# Patient Record
Sex: FEMALE | Race: WHITE | NOT HISPANIC OR LATINO | Employment: UNEMPLOYED | URBAN - NONMETROPOLITAN AREA
[De-identification: names, ages, dates, MRNs, and addresses within clinical notes are randomized per-mention and may not be internally consistent; named-entity substitution may affect disease eponyms.]

---

## 2017-02-07 RX ORDER — RISPERIDONE 2 MG/1
TABLET ORAL
Qty: 60 TABLET | Refills: 0 | OUTPATIENT
Start: 2017-02-07

## 2017-02-07 RX ORDER — ESCITALOPRAM OXALATE 20 MG/1
TABLET ORAL
Qty: 30 TABLET | Refills: 0 | OUTPATIENT
Start: 2017-02-07

## 2017-02-09 ENCOUNTER — TELEPHONE (OUTPATIENT)
Dept: FAMILY MEDICINE CLINIC | Facility: CLINIC | Age: 37
End: 2017-02-09

## 2017-02-09 RX ORDER — RISPERIDONE 1 MG/1
1 TABLET ORAL 2 TIMES DAILY
Qty: 60 TABLET | Refills: 3 | Status: SHIPPED | OUTPATIENT
Start: 2017-02-09 | End: 2019-05-22

## 2017-02-09 RX ORDER — ESCITALOPRAM OXALATE 20 MG/1
20 TABLET ORAL DAILY
Qty: 30 TABLET | Refills: 3 | Status: SHIPPED | OUTPATIENT
Start: 2017-02-09 | End: 2019-05-22

## 2017-02-09 NOTE — TELEPHONE ENCOUNTER
----- Message from Lavon Traore MD sent at 2/9/2017  2:14 PM CST -----  Regarding: RE: PRESCRIPTIONS  Contact: [phone]  Could you let her know I sent them in, thank you   ----- Message -----     From: Kiran RODRIGUEZ Joshua     Sent: 2/9/2017   2:02 PM       To: Lavon Traore MD  Subject: PRESCRIPTIONS                                    PT is calling because she ran out of refills on her prescription. I set her up with an appt on 2/13/17 because I said she would probably have to be seen before any refills were sent. Said I would stills send a message to see if you can still refill her prescriptions before the appt.    PT is requesting a refill of: LEXAPRO, RISPERDAL  Sent to: JAELYN DASILVA    Please send this at your earliest convenience or let PT know if there is any issues filling this Prescription.    Thanks.

## 2017-03-27 ENCOUNTER — LAB (OUTPATIENT)
Dept: LAB | Facility: HOSPITAL | Age: 37
End: 2017-03-27

## 2017-03-27 ENCOUNTER — OFFICE VISIT (OUTPATIENT)
Dept: ENDOCRINOLOGY | Facility: CLINIC | Age: 37
End: 2017-03-27

## 2017-03-27 VITALS
WEIGHT: 139.2 LBS | HEIGHT: 63 IN | BODY MASS INDEX: 24.66 KG/M2 | HEART RATE: 83 BPM | SYSTOLIC BLOOD PRESSURE: 98 MMHG | DIASTOLIC BLOOD PRESSURE: 64 MMHG

## 2017-03-27 DIAGNOSIS — R94.6 ABNORMAL THYROID FUNCTION TEST: Primary | ICD-10-CM

## 2017-03-27 DIAGNOSIS — Z72.0 TOBACCO ABUSE: ICD-10-CM

## 2017-03-27 LAB
ALBUMIN SERPL-MCNC: 4.3 G/DL (ref 3.4–4.8)
ALBUMIN/GLOB SERPL: 1.6 G/DL (ref 1.1–1.8)
ALP SERPL-CCNC: 61 U/L (ref 38–126)
ALT SERPL W P-5'-P-CCNC: 23 U/L (ref 9–52)
ANION GAP SERPL CALCULATED.3IONS-SCNC: 14 MMOL/L (ref 5–15)
AST SERPL-CCNC: 27 U/L (ref 14–36)
BASOPHILS # BLD AUTO: 0.03 10*3/MM3 (ref 0–0.2)
BASOPHILS NFR BLD AUTO: 0.5 % (ref 0–2)
BILIRUB SERPL-MCNC: 0.4 MG/DL (ref 0.2–1.3)
BUN BLD-MCNC: 18 MG/DL (ref 7–21)
BUN/CREAT SERPL: 25.7 (ref 7–25)
CALCIUM SPEC-SCNC: 9.3 MG/DL (ref 8.4–10.2)
CHLORIDE SERPL-SCNC: 106 MMOL/L (ref 95–110)
CO2 SERPL-SCNC: 21 MMOL/L (ref 22–31)
CREAT BLD-MCNC: 0.7 MG/DL (ref 0.5–1)
DEPRECATED RDW RBC AUTO: 43.5 FL (ref 36.4–46.3)
EOSINOPHIL # BLD AUTO: 0.32 10*3/MM3 (ref 0–0.7)
EOSINOPHIL NFR BLD AUTO: 5.3 % (ref 0–7)
ERYTHROCYTE [DISTWIDTH] IN BLOOD BY AUTOMATED COUNT: 13.4 % (ref 11.5–14.5)
GFR SERPL CREATININE-BSD FRML MDRD: 96 ML/MIN/1.73 (ref 64–149)
GLOBULIN UR ELPH-MCNC: 2.7 GM/DL (ref 2.3–3.5)
GLUCOSE BLD-MCNC: 89 MG/DL (ref 60–100)
HCT VFR BLD AUTO: 40.5 % (ref 35–45)
HGB BLD-MCNC: 13.8 G/DL (ref 12–15.5)
IMM GRANULOCYTES # BLD: 0 10*3/MM3 (ref 0–0.02)
IMM GRANULOCYTES NFR BLD: 0 % (ref 0–0.5)
LYMPHOCYTES # BLD AUTO: 1.93 10*3/MM3 (ref 0.6–4.2)
LYMPHOCYTES NFR BLD AUTO: 31.7 % (ref 10–50)
MCH RBC QN AUTO: 30.1 PG (ref 26.5–34)
MCHC RBC AUTO-ENTMCNC: 34.1 G/DL (ref 31.4–36)
MCV RBC AUTO: 88.4 FL (ref 80–98)
MONOCYTES # BLD AUTO: 0.5 10*3/MM3 (ref 0–0.9)
MONOCYTES NFR BLD AUTO: 8.2 % (ref 0–12)
NEUTROPHILS # BLD AUTO: 3.31 10*3/MM3 (ref 2–8.6)
NEUTROPHILS NFR BLD AUTO: 54.3 % (ref 37–80)
PLATELET # BLD AUTO: 240 10*3/MM3 (ref 150–450)
PMV BLD AUTO: 10.7 FL (ref 8–12)
POTASSIUM BLD-SCNC: 3.9 MMOL/L (ref 3.5–5.1)
PROT SERPL-MCNC: 7 G/DL (ref 6.3–8.6)
RBC # BLD AUTO: 4.58 10*6/MM3 (ref 3.77–5.16)
SODIUM BLD-SCNC: 141 MMOL/L (ref 137–145)
T3 SERPL-MCNC: 120 NG/DL (ref 97–169)
T4 FREE SERPL-MCNC: 1.09 NG/DL (ref 0.78–2.19)
TSH SERPL DL<=0.05 MIU/L-ACNC: 0.21 MIU/ML (ref 0.46–4.68)
WBC NRBC COR # BLD: 6.09 10*3/MM3 (ref 3.2–9.8)

## 2017-03-27 PROCEDURE — 99204 OFFICE O/P NEW MOD 45 MIN: CPT | Performed by: NURSE PRACTITIONER

## 2017-03-27 PROCEDURE — 84480 ASSAY TRIIODOTHYRONINE (T3): CPT | Performed by: NURSE PRACTITIONER

## 2017-03-27 PROCEDURE — 86376 MICROSOMAL ANTIBODY EACH: CPT | Performed by: NURSE PRACTITIONER

## 2017-03-27 PROCEDURE — 80050 GENERAL HEALTH PANEL: CPT | Performed by: NURSE PRACTITIONER

## 2017-03-27 PROCEDURE — 84439 ASSAY OF FREE THYROXINE: CPT | Performed by: NURSE PRACTITIONER

## 2017-03-27 PROCEDURE — 36415 COLL VENOUS BLD VENIPUNCTURE: CPT | Performed by: NURSE PRACTITIONER

## 2017-03-27 PROCEDURE — 84445 ASSAY OF TSI GLOBULIN: CPT | Performed by: NURSE PRACTITIONER

## 2017-03-27 RX ORDER — MECLIZINE HYDROCHLORIDE 25 MG/1
TABLET ORAL
Refills: 1 | COMMUNITY
Start: 2017-03-11 | End: 2019-05-22

## 2017-03-27 RX ORDER — ALBUTEROL SULFATE 90 UG/1
2 AEROSOL, METERED RESPIRATORY (INHALATION) EVERY 6 HOURS
COMMUNITY
End: 2021-07-21 | Stop reason: SDUPTHER

## 2017-03-27 NOTE — PROGRESS NOTES
Referring provider Mary Hernandez PA-C      History     Reason - abnormal thyroid function test      Duration  - since March 13, 2017    Timing - constant    Quality - not controlled    Severity - moderate    Context - presented to the urgent care for dizziness , lab workup revealed abnormal thyroid function test       Quantity -     March 13, 2017    TSH - 0.135   Free T4 - 1.24     Symptoms - dizziness, hair loss, weight gain, fatigue     Alleviating Factors - none    Aggravating Factors - none      Past Medical History:   Diagnosis Date   • Acute gingivitis, plaque induced    • Allergic rhinitis     Claritin, doing well      • Asthma    • Attention deficit hyperactivity disorder, predominantly inattentive type    • Backache     Chronic, on Narcotics, helping      • Candidiasis of mouth    • Constipation    • Depressive disorder    • Diarrhea    • Low back pain    • Medication monitoring encounter      UDS ordered w/ confirmatory test      • Medication monitoring encounter     UDS ordered w/ confirmatory test      • Nausea and vomiting    • Pain and other symptoms associated with female genital organs    • Pain in throat    • Thoracic region somatic dysfunction     Controlled with Percocet only      • Tobacco dependence syndrome    • Upper respiratory infection    • Vulvovaginitis      Past Surgical History:   Procedure Laterality Date   • INJECTION OF MEDICATION  01/19/2011    kenalog(1)    • INJECTION OF MEDICATION  09/14/2011    phenergan(1)      No family history on file.  Social History   Substance Use Topics   • Smoking status: Current Every Day Smoker   • Smokeless tobacco: Not on file   • Alcohol use Yes      Comment: hard liquor; Has 1 drink/week on average, RARE           Current Outpatient Prescriptions:   •  amphetamine-dextroamphetamine (ADDERALL) 10 MG tablet, Take 1 tablet by mouth daily., Disp: 30 tablet, Rfl: 0  •  buPROPion XL (WELLBUTRIN XL) 150 MG 24 hr tablet, Take 1 tablet by mouth daily.,  Disp: 30 tablet, Rfl: 0  •  dexmethylphenidate (FOCALIN) 2.5 MG tablet, Take 2.5 mg by mouth 2 (two) times a day., Disp: , Rfl:   •  escitalopram (LEXAPRO) 20 MG tablet, Take 1 tablet by mouth Daily., Disp: 30 tablet, Rfl: 3  •  risperiDONE (RISPERDAL) 1 MG tablet, Take 1 tablet by mouth 2 (Two) Times a Day., Disp: 60 tablet, Rfl: 3        Review of Systems   Constitutional: Positive for fatigue. Negative for activity change, appetite change, chills, diaphoresis and unexpected weight change.   HENT: Negative for congestion, dental problem, drooling, ear discharge, ear pain, facial swelling, sneezing, sore throat, tinnitus, trouble swallowing and voice change.    Eyes: Negative for photophobia, pain, discharge, redness, itching and visual disturbance.   Respiratory: Negative for apnea, cough, choking, chest tightness and shortness of breath.    Cardiovascular: Negative for chest pain, palpitations and leg swelling.   Gastrointestinal: Negative for abdominal distention, abdominal pain, constipation, diarrhea, nausea and vomiting.   Endocrine: Negative for cold intolerance, heat intolerance, polydipsia, polyphagia and polyuria.   Genitourinary: Negative for difficulty urinating, dysuria, frequency, hematuria and urgency.   Musculoskeletal: Negative for arthralgias, back pain, gait problem, joint swelling, myalgias, neck pain and neck stiffness.   Skin: Negative for color change, pallor, rash and wound.   Allergic/Immunologic: Negative for environmental allergies, food allergies and immunocompromised state.   Neurological: Positive for dizziness. Negative for tremors, facial asymmetry, weakness, light-headedness, numbness and headaches.   Hematological: Negative for adenopathy. Does not bruise/bleed easily.   Psychiatric/Behavioral: Negative for agitation, behavioral problems, confusion, decreased concentration and sleep disturbance.       LMP 01/05/2010    Physical Exam   Constitutional: She is oriented to person,  place, and time. She appears well-developed and well-nourished. No distress.   HENT:   Head: Normocephalic and atraumatic.   Right Ear: External ear normal.   Left Ear: External ear normal.   Nose: Nose normal.   Eyes: Conjunctivae and EOM are normal. Pupils are equal, round, and reactive to light.   Neck: Normal range of motion. Neck supple. No tracheal deviation present. No thyromegaly present.   Cardiovascular: Normal rate, regular rhythm and normal heart sounds.    No murmur heard.  Pulmonary/Chest: Effort normal and breath sounds normal. No respiratory distress. She has no wheezes.   Abdominal: Soft. Bowel sounds are normal. There is no tenderness. There is no rebound and no guarding.   Musculoskeletal: Normal range of motion. She exhibits no edema, tenderness or deformity.   Neurological: She is alert and oriented to person, place, and time. No cranial nerve deficit.   Skin: Skin is warm and dry. No rash noted.   Psychiatric: She has a normal mood and affect. Her behavior is normal. Judgment and thought content normal.           Labs    Lab Results   Component Value Date    GLUCOSE 78 08/21/2015    BUN 12 08/21/2015    CREATININE 0.8 08/21/2015    K 3.5 08/21/2015    CO2 27 08/21/2015    CALCIUM 9.3 08/21/2015    ALBUMIN 4.1 08/21/2015    AST 21 08/21/2015    ALT 23 08/21/2015       Lab Results   Component Value Date    WBC 7.1 08/21/2015    HGB 13.1 08/21/2015    HCT 37.9 08/21/2015    MCV 89.0 08/21/2015     08/21/2015       Lab Results   Component Value Date    WAQF23KG 49.3 08/21/2015       No results found for: HGBA1C          No results found for: TSH    No results found for: FREET4    No results found for: C6ZBGNV    Thyroid Peroxidase Antibodies    No results found for: THYROIDAB    ThyroidStimulating Immunoglobulin    No results found for: LABTHYR      Assessment         ICD-10-CM ICD-9-CM   1. Abnormal thyroid function test R94.6 794.5           Abnormal thyroid function test    In summary Ms.  Elizabeth presented to the urgent care for dizziness and work up revealed abnormal thyroid function test. She has been referred to us for further evaluation.    Plan    Repeat thyroid function studies today - TSH, Free T4, T3, TSI and TPO ab    Differential diagnosis include subclinical hyperthyroidism     If levels are hyperthyroid will schedule thyroid ultrasound and thyroid uptake and scan     Discussed methimazole side including rash, hives, fever, sore throat , abdominal pain, vomiting, yellowing of the skin stop the medication and call the office immediately    Will treat is TSH is less than 0.1 but cannot start until after the thyroid scan     Preventive Care:   patient is smoking     Smoking cessation discussed      Records from Mary Hernandez PA-C received and reviewed from 2017  Thank you for this consultation               Records from 2017 reviewed and summarized  Labs reviewed and ordered  Thyroid imaging ordered

## 2017-03-28 LAB — THYROPEROXIDASE AB SERPL-ACNC: 15 IU/ML (ref 0–34)

## 2017-03-31 LAB — TSI ACT/NOR SER: 111 % (ref 0–139)

## 2017-04-17 ENCOUNTER — TELEPHONE (OUTPATIENT)
Dept: ENDOCRINOLOGY | Facility: CLINIC | Age: 37
End: 2017-04-17

## 2017-04-17 NOTE — TELEPHONE ENCOUNTER
----- Message from BART Lindsay sent at 4/17/2017 12:30 PM CDT -----  Regarding: RE: LAB RESULTS  Contact: [phone]  Let her know the TSH improved to 0.2 from 0.1 T3, and T4 normal; labs for Graves disease and Hashimoto's negative; she needs the u/s and scan but she no showed; no treatment unless the TSH falls to less than 0.1 unless the scan shows otherwise; she needs to reschedule      ----- Message -----     From: Lilli Sims MA     Sent: 4/17/2017  11:56 AM       To: BART Lindsay  Subject: FW: LAB RESULTS                                      ----- Message -----     From: Cheryl Pastor     Sent: 4/17/2017  11:37 AM       To: Lilli Sims MA  Subject: LAB RESULTS                                      PT IS WANTING TO KNOW IF HER LABS RESULTS ARE I WHERE SHE HAD THEM DONE 2 WEEKS AGO. COULD YOU PLEASE CHECK AND SEE FOR HER? THANK YOU TP. HER NUMBER [phone].

## 2017-05-23 ENCOUNTER — TELEPHONE (OUTPATIENT)
Dept: ENDOCRINOLOGY | Facility: CLINIC | Age: 37
End: 2017-05-23

## 2017-06-06 ENCOUNTER — TELEPHONE (OUTPATIENT)
Dept: ENDOCRINOLOGY | Facility: CLINIC | Age: 37
End: 2017-06-06

## 2017-06-19 RX ORDER — ESCITALOPRAM OXALATE 20 MG/1
TABLET ORAL
Qty: 30 TABLET | Refills: 0 | OUTPATIENT
Start: 2017-06-19

## 2019-05-22 ENCOUNTER — OFFICE VISIT (OUTPATIENT)
Dept: ORTHOPEDIC SURGERY | Facility: CLINIC | Age: 39
End: 2019-05-22

## 2019-05-22 VITALS — HEIGHT: 63 IN | WEIGHT: 135 LBS | BODY MASS INDEX: 23.92 KG/M2

## 2019-05-22 DIAGNOSIS — Z72.0 TOBACCO ABUSE: ICD-10-CM

## 2019-05-22 DIAGNOSIS — M79.644 FINGER PAIN, RIGHT: Primary | ICD-10-CM

## 2019-05-22 DIAGNOSIS — M79.644 PAIN OF RIGHT MIDDLE FINGER: Primary | ICD-10-CM

## 2019-05-22 DIAGNOSIS — S62.662A CLOSED NONDISPLACED FRACTURE OF DISTAL PHALANX OF RIGHT MIDDLE FINGER, INITIAL ENCOUNTER: ICD-10-CM

## 2019-05-22 PROCEDURE — 26750 TREAT FINGER FRACTURE EACH: CPT | Performed by: NURSE PRACTITIONER

## 2019-05-22 PROCEDURE — 99214 OFFICE O/P EST MOD 30 MIN: CPT | Performed by: NURSE PRACTITIONER

## 2019-05-22 RX ORDER — IBUPROFEN 800 MG/1
800 TABLET ORAL EVERY 6 HOURS PRN
COMMUNITY
End: 2020-06-27

## 2019-05-22 RX ORDER — ACETAMINOPHEN AND CODEINE PHOSPHATE 300; 30 MG/1; MG/1
1 TABLET ORAL EVERY 8 HOURS PRN
Qty: 30 TABLET | Refills: 0 | Status: SHIPPED | OUTPATIENT
Start: 2019-05-22 | End: 2019-06-01
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
## 2019-05-23 ENCOUNTER — TELEPHONE (OUTPATIENT)
Dept: ORTHOPEDIC SURGERY | Facility: CLINIC | Age: 39
End: 2019-05-23

## 2020-06-27 ENCOUNTER — HOSPITAL ENCOUNTER (EMERGENCY)
Facility: HOSPITAL | Age: 40
Discharge: HOME | End: 2020-06-27
Attending: FAMILY MEDICINE | Admitting: EMERGENCY MEDICINE

## 2020-06-27 ENCOUNTER — APPOINTMENT (OUTPATIENT)
Dept: GENERAL RADIOLOGY | Facility: HOSPITAL | Age: 40
End: 2020-06-27

## 2020-06-27 VITALS
BODY MASS INDEX: 22.5 KG/M2 | HEIGHT: 63 IN | TEMPERATURE: 98.4 F | RESPIRATION RATE: 22 BRPM | HEART RATE: 94 BPM | SYSTOLIC BLOOD PRESSURE: 115 MMHG | OXYGEN SATURATION: 99 % | DIASTOLIC BLOOD PRESSURE: 70 MMHG | WEIGHT: 127 LBS

## 2020-06-27 DIAGNOSIS — M54.2 NECK PAIN: Primary | ICD-10-CM

## 2020-06-27 DIAGNOSIS — M54.9 UPPER BACK PAIN: ICD-10-CM

## 2020-06-27 LAB
ALBUMIN SERPL-MCNC: 4.7 G/DL (ref 3.5–5.2)
ALBUMIN/GLOB SERPL: 2 G/DL
ALP SERPL-CCNC: 51 U/L (ref 39–117)
ALT SERPL W P-5'-P-CCNC: 12 U/L (ref 1–33)
AMPHET+METHAMPHET UR QL: NEGATIVE
AMPHETAMINES UR QL: NEGATIVE
ANION GAP SERPL CALCULATED.3IONS-SCNC: 10 MMOL/L (ref 5–15)
AST SERPL-CCNC: 15 U/L (ref 1–32)
B-HCG UR QL: NEGATIVE
BARBITURATES UR QL SCN: NEGATIVE
BASOPHILS # BLD AUTO: 0.05 10*3/MM3 (ref 0–0.2)
BASOPHILS NFR BLD AUTO: 0.5 % (ref 0–1.5)
BENZODIAZ UR QL SCN: NEGATIVE
BILIRUB SERPL-MCNC: 0.3 MG/DL (ref 0.2–1.2)
BUN BLD-MCNC: 13 MG/DL (ref 6–20)
BUN/CREAT SERPL: 21.3 (ref 7–25)
BUPRENORPHINE SERPL-MCNC: NEGATIVE NG/ML
CALCIUM SPEC-SCNC: 9.4 MG/DL (ref 8.6–10.5)
CANNABINOIDS SERPL QL: POSITIVE
CHLORIDE SERPL-SCNC: 104 MMOL/L (ref 98–107)
CO2 SERPL-SCNC: 24 MMOL/L (ref 22–29)
COCAINE UR QL: NEGATIVE
CREAT BLD-MCNC: 0.61 MG/DL (ref 0.57–1)
DEPRECATED RDW RBC AUTO: 41 FL (ref 37–54)
EOSINOPHIL # BLD AUTO: 0.13 10*3/MM3 (ref 0–0.4)
EOSINOPHIL NFR BLD AUTO: 1.4 % (ref 0.3–6.2)
ERYTHROCYTE [DISTWIDTH] IN BLOOD BY AUTOMATED COUNT: 12.6 % (ref 12.3–15.4)
GFR SERPL CREATININE-BSD FRML MDRD: 110 ML/MIN/1.73
GLOBULIN UR ELPH-MCNC: 2.3 GM/DL
GLUCOSE BLD-MCNC: 112 MG/DL (ref 65–99)
HCT VFR BLD AUTO: 41.7 % (ref 34–46.6)
HGB BLD-MCNC: 14.4 G/DL (ref 12–15.9)
HOLD SPECIMEN: NORMAL
IMM GRANULOCYTES # BLD AUTO: 0.03 10*3/MM3 (ref 0–0.05)
IMM GRANULOCYTES NFR BLD AUTO: 0.3 % (ref 0–0.5)
LYMPHOCYTES # BLD AUTO: 2.14 10*3/MM3 (ref 0.7–3.1)
LYMPHOCYTES NFR BLD AUTO: 23.3 % (ref 19.6–45.3)
MCH RBC QN AUTO: 30.6 PG (ref 26.6–33)
MCHC RBC AUTO-ENTMCNC: 34.5 G/DL (ref 31.5–35.7)
MCV RBC AUTO: 88.5 FL (ref 79–97)
METHADONE UR QL SCN: NEGATIVE
MONOCYTES # BLD AUTO: 0.64 10*3/MM3 (ref 0.1–0.9)
MONOCYTES NFR BLD AUTO: 7 % (ref 5–12)
NEUTROPHILS # BLD AUTO: 6.18 10*3/MM3 (ref 1.7–7)
NEUTROPHILS NFR BLD AUTO: 67.5 % (ref 42.7–76)
NRBC BLD AUTO-RTO: 0 /100 WBC (ref 0–0.2)
OPIATES UR QL: NEGATIVE
OXYCODONE UR QL SCN: NEGATIVE
PCP UR QL SCN: NEGATIVE
PLATELET # BLD AUTO: 293 10*3/MM3 (ref 140–450)
PMV BLD AUTO: 10.5 FL (ref 6–12)
POTASSIUM BLD-SCNC: 4.1 MMOL/L (ref 3.5–5.2)
PROPOXYPH UR QL: NEGATIVE
PROT SERPL-MCNC: 7 G/DL (ref 6–8.5)
RBC # BLD AUTO: 4.71 10*6/MM3 (ref 3.77–5.28)
SODIUM BLD-SCNC: 138 MMOL/L (ref 136–145)
TRICYCLICS UR QL SCN: NEGATIVE
WBC NRBC COR # BLD: 9.17 10*3/MM3 (ref 3.4–10.8)
WHOLE BLOOD HOLD SPECIMEN: NORMAL

## 2020-06-27 PROCEDURE — 81025 URINE PREGNANCY TEST: CPT | Performed by: PHYSICIAN ASSISTANT

## 2020-06-27 PROCEDURE — 25010000002 ORPHENADRINE CITRATE PER 60 MG: Performed by: PHYSICIAN ASSISTANT

## 2020-06-27 PROCEDURE — 80306 DRUG TEST PRSMV INSTRMNT: CPT | Performed by: PHYSICIAN ASSISTANT

## 2020-06-27 PROCEDURE — 80053 COMPREHEN METABOLIC PANEL: CPT | Performed by: PHYSICIAN ASSISTANT

## 2020-06-27 PROCEDURE — 72040 X-RAY EXAM NECK SPINE 2-3 VW: CPT

## 2020-06-27 PROCEDURE — 96372 THER/PROPH/DIAG INJ SC/IM: CPT

## 2020-06-27 PROCEDURE — 25010000002 KETOROLAC TROMETHAMINE PER 15 MG: Performed by: PHYSICIAN ASSISTANT

## 2020-06-27 PROCEDURE — 85025 COMPLETE CBC W/AUTO DIFF WBC: CPT | Performed by: PHYSICIAN ASSISTANT

## 2020-06-27 PROCEDURE — 99283 EMERGENCY DEPT VISIT LOW MDM: CPT

## 2020-06-27 RX ORDER — NAPROXEN 500 MG/1
500 TABLET ORAL 2 TIMES DAILY WITH MEALS
Qty: 14 TABLET | Refills: 0 | Status: SHIPPED | OUTPATIENT
Start: 2020-06-27 | End: 2020-07-04

## 2020-06-27 RX ORDER — KETOROLAC TROMETHAMINE 30 MG/ML
30 INJECTION, SOLUTION INTRAMUSCULAR; INTRAVENOUS ONCE
Status: COMPLETED | OUTPATIENT
Start: 2020-06-27 | End: 2020-06-27

## 2020-06-27 RX ORDER — ORPHENADRINE CITRATE 30 MG/ML
60 INJECTION INTRAMUSCULAR; INTRAVENOUS ONCE
Status: COMPLETED | OUTPATIENT
Start: 2020-06-27 | End: 2020-06-27

## 2020-06-27 RX ORDER — ORPHENADRINE CITRATE 100 MG/1
100 TABLET, EXTENDED RELEASE ORAL 2 TIMES DAILY
Qty: 14 TABLET | Refills: 0 | Status: SHIPPED | OUTPATIENT
Start: 2020-06-27 | End: 2020-07-04

## 2020-06-27 RX ADMIN — ORPHENADRINE CITRATE 60 MG: 30 INJECTION INTRAMUSCULAR; INTRAVENOUS at 15:28

## 2020-06-27 RX ADMIN — KETOROLAC TROMETHAMINE 30 MG: 30 INJECTION, SOLUTION INTRAMUSCULAR at 15:28

## 2021-07-21 ENCOUNTER — OFFICE VISIT (OUTPATIENT)
Dept: FAMILY MEDICINE CLINIC | Facility: CLINIC | Age: 41
End: 2021-07-21

## 2021-07-21 ENCOUNTER — LAB (OUTPATIENT)
Dept: LAB | Facility: HOSPITAL | Age: 41
End: 2021-07-21

## 2021-07-21 VITALS
DIASTOLIC BLOOD PRESSURE: 62 MMHG | BODY MASS INDEX: 21.73 KG/M2 | SYSTOLIC BLOOD PRESSURE: 118 MMHG | OXYGEN SATURATION: 98 % | WEIGHT: 118.06 LBS | HEIGHT: 62 IN | HEART RATE: 100 BPM

## 2021-07-21 DIAGNOSIS — J45.902 REACTIVE AIRWAY DISEASE WITH STATUS ASTHMATICUS, UNSPECIFIED ASTHMA SEVERITY, UNSPECIFIED WHETHER PERSISTENT: ICD-10-CM

## 2021-07-21 DIAGNOSIS — F33.9 RECURRENT MAJOR DEPRESSIVE DISORDER, REMISSION STATUS UNSPECIFIED (HCC): Primary | ICD-10-CM

## 2021-07-21 DIAGNOSIS — F33.9 RECURRENT MAJOR DEPRESSIVE DISORDER, REMISSION STATUS UNSPECIFIED (HCC): ICD-10-CM

## 2021-07-21 DIAGNOSIS — G47.00 INSOMNIA, UNSPECIFIED TYPE: ICD-10-CM

## 2021-07-21 PROCEDURE — 84439 ASSAY OF FREE THYROXINE: CPT

## 2021-07-21 PROCEDURE — 99214 OFFICE O/P EST MOD 30 MIN: CPT | Performed by: FAMILY MEDICINE

## 2021-07-21 PROCEDURE — 36415 COLL VENOUS BLD VENIPUNCTURE: CPT

## 2021-07-21 PROCEDURE — 80050 GENERAL HEALTH PANEL: CPT

## 2021-07-21 RX ORDER — HYDROCODONE BITARTRATE AND ACETAMINOPHEN 5; 325 MG/1; MG/1
TABLET ORAL
COMMUNITY
Start: 2021-07-19 | End: 2021-08-17

## 2021-07-21 RX ORDER — CLINDAMYCIN HYDROCHLORIDE 150 MG/1
CAPSULE ORAL
COMMUNITY
Start: 2021-07-19 | End: 2021-08-17

## 2021-07-21 RX ORDER — LORATADINE 10 MG/1
TABLET ORAL
COMMUNITY
Start: 2021-07-19 | End: 2021-08-17

## 2021-07-21 RX ORDER — ALBUTEROL SULFATE 90 UG/1
2 POWDER, METERED RESPIRATORY (INHALATION) EVERY 4 HOURS PRN
Qty: 1 EACH | Refills: 11 | Status: SHIPPED | OUTPATIENT
Start: 2021-07-21

## 2021-07-21 RX ORDER — ESCITALOPRAM OXALATE 20 MG/1
20 TABLET ORAL DAILY
Qty: 30 TABLET | Refills: 2 | Status: SHIPPED | OUTPATIENT
Start: 2021-07-21 | End: 2021-10-29

## 2021-07-21 NOTE — PROGRESS NOTES
Subjective   Mercy Johnson is a 40 y.o. female.   Cc: follow up of chronic medical issues  The patient comes in to establish care.  Depression  Visit Type: follow-up  Patient presents with the following symptoms: anhedonia, confusion, decreased concentration, depressed mood, dizziness, excessive worry, fatigue, feelings of hopelessness, feelings of worthlessness, hypersomnia, hyperventilation, insomnia, irritability, muscle tension, nervousness/anxiety, panic and restlessness.  Patient is not experiencing: chest pain, choking sensation, compulsions, dry mouth, malaise, memory impairment, nausea, obsessions, palpitations, shortness of breath, suicidal ideas, suicidal planning and thoughts of death.    Insomnia  This is a chronic problem. The current episode started more than 1 year ago. The problem occurs intermittently. Associated symptoms include abdominal pain, anorexia, a change in bowel habit, congestion, fatigue, headaches, neck pain and weakness. Pertinent negatives include no arthralgias, chest pain, chills, coughing, diaphoresis, fever, joint swelling, myalgias, nausea, numbness, rash, sore throat, swollen glands, urinary symptoms, vertigo, visual change or vomiting.   She has a history of reactive airway disorder.Her symptoms are stable. She needs a refill of her Albuterol Inhaler,    The following portions of the patient's history were reviewed and updated as appropriate: allergies, current medications, past family history, past medical history, past social history, past surgical history and problem list.    Review of Systems   Constitutional: Positive for fatigue, irritability and unexpected weight change. Negative for activity change, chills, diaphoresis and fever.   HENT: Positive for congestion, ear pain, sinus pressure, sinus pain and sneezing. Negative for ear discharge, facial swelling, hearing loss, mouth sores, nosebleeds, postnasal drip, rhinorrhea, sore throat, tinnitus, trouble swallowing  and voice change.    Eyes: Negative for photophobia, pain, discharge, redness, itching and visual disturbance.   Respiratory: Negative for cough, choking, chest tightness, shortness of breath, wheezing and stridor.    Cardiovascular: Negative for chest pain, palpitations and leg swelling.   Gastrointestinal: Positive for abdominal pain, anorexia and change in bowel habit. Negative for abdominal distention, blood in stool, constipation, diarrhea, nausea, rectal pain and vomiting.   Endocrine: Negative for cold intolerance, heat intolerance, polydipsia, polyphagia and polyuria.   Genitourinary: Positive for decreased urine volume, difficulty urinating, flank pain and urgency. Negative for dyspareunia, dysuria, enuresis, frequency, genital sores, hematuria, menstrual problem, pelvic pain, vaginal bleeding, vaginal discharge and vaginal pain.   Musculoskeletal: Positive for back pain and neck pain. Negative for arthralgias, gait problem, joint swelling, myalgias and neck stiffness.   Skin: Negative for rash and wound.   Allergic/Immunologic: Positive for environmental allergies. Negative for food allergies and immunocompromised state.   Neurological: Positive for weakness, light-headedness and headaches. Negative for dizziness, vertigo, tremors, seizures, syncope, speech difficulty and numbness.   Hematological: Negative for adenopathy. Bruises/bleeds easily.   Psychiatric/Behavioral: Positive for agitation, behavioral problems, confusion, decreased concentration and dysphoric mood. Negative for hallucinations, self-injury, sleep disturbance and suicidal ideas. The patient is nervous/anxious and has insomnia. The patient is not hyperactive.        Objective   Physical Exam  Vitals reviewed.   Constitutional:       Appearance: Normal appearance.   HENT:      Head: Normocephalic and atraumatic.      Right Ear: Tympanic membrane, ear canal and external ear normal.      Left Ear: Tympanic membrane, ear canal and external  "ear normal.      Nose: Nose normal.      Mouth/Throat:      Mouth: Mucous membranes are moist.      Pharynx: Oropharynx is clear.   Cardiovascular:      Rate and Rhythm: Normal rate and regular rhythm.      Heart sounds: Normal heart sounds. No murmur heard.   No friction rub. No gallop.    Pulmonary:      Effort: Pulmonary effort is normal. No respiratory distress.      Breath sounds: Normal breath sounds. No stridor. No wheezing, rhonchi or rales.   Chest:      Chest wall: No tenderness.   Abdominal:      General: Abdomen is flat. Bowel sounds are normal. There is no distension.      Palpations: Abdomen is soft. There is no mass.      Tenderness: There is no abdominal tenderness. There is no guarding or rebound.      Hernia: No hernia is present.   Musculoskeletal:      Cervical back: Normal range of motion.      Comments: Back is non tender.   Skin:     General: Skin is warm and dry.   Neurological:      Mental Status: She is alert.           Visit Vitals  /62   Pulse 100   Ht 157.5 cm (62\")   Wt 53.6 kg (118 lb 1 oz)   LMP 01/05/2010 (Exact Date) Comment: HYSTO   SpO2 98%   Breastfeeding No   BMI 21.59 kg/m²     Body mass index is 21.59 kg/m².      Assessment/Plan   Diagnoses and all orders for this visit:    1. Recurrent major depressive disorder, remission status unspecified (CMS/[facility]) (Primary)  -     Comprehensive metabolic panel; Future  -     CBC w AUTO Differential; Future  -     T4, free; Future  -     TSH; Future  -     Ambulatory Referral to Psychology    2. Insomnia, unspecified type    3. Reactive airway disease with status asthmaticus, unspecified asthma severity, unspecified whether persistent    Other orders  -     albuterol (ProAir RespiClick) 108 (90 Base) MCG/ACT inhaler; Inhale 2 puffs Every 4 (Four) Hours As Needed for Wheezing or Shortness of Air.  Dispense: 1 each; Refill: 11  -     escitalopram (Lexapro) 20 MG tablet; Take 1 tablet by mouth Daily.  Dispense: 30 tablet; Refill: " 2    Return to the clinic in 6 week/s.  Will contact with results as needed.

## 2021-07-22 LAB
ALBUMIN SERPL-MCNC: 5 G/DL (ref 3.5–5.2)
ALBUMIN/GLOB SERPL: 1.6 G/DL
ALP SERPL-CCNC: 73 U/L (ref 39–117)
ALT SERPL W P-5'-P-CCNC: 15 U/L (ref 1–33)
ANION GAP SERPL CALCULATED.3IONS-SCNC: 17.7 MMOL/L (ref 5–15)
AST SERPL-CCNC: 17 U/L (ref 1–32)
BASOPHILS # BLD AUTO: 0.05 10*3/MM3 (ref 0–0.2)
BASOPHILS NFR BLD AUTO: 0.7 % (ref 0–1.5)
BILIRUB SERPL-MCNC: 0.3 MG/DL (ref 0–1.2)
BUN SERPL-MCNC: 11 MG/DL (ref 6–20)
BUN/CREAT SERPL: 15.5 (ref 7–25)
CALCIUM SPEC-SCNC: 9.9 MG/DL (ref 8.6–10.5)
CHLORIDE SERPL-SCNC: 100 MMOL/L (ref 98–107)
CO2 SERPL-SCNC: 22.3 MMOL/L (ref 22–29)
CREAT SERPL-MCNC: 0.71 MG/DL (ref 0.57–1)
DEPRECATED RDW RBC AUTO: 41.4 FL (ref 37–54)
EOSINOPHIL # BLD AUTO: 0.02 10*3/MM3 (ref 0–0.4)
EOSINOPHIL NFR BLD AUTO: 0.3 % (ref 0.3–6.2)
ERYTHROCYTE [DISTWIDTH] IN BLOOD BY AUTOMATED COUNT: 13 % (ref 12.3–15.4)
GFR SERPL CREATININE-BSD FRML MDRD: 92 ML/MIN/1.73
GLOBULIN UR ELPH-MCNC: 3.2 GM/DL
GLUCOSE SERPL-MCNC: 105 MG/DL (ref 65–99)
HCT VFR BLD AUTO: 43.9 % (ref 34–46.6)
HGB BLD-MCNC: 14.8 G/DL (ref 12–15.9)
IMM GRANULOCYTES # BLD AUTO: 0.05 10*3/MM3 (ref 0–0.05)
IMM GRANULOCYTES NFR BLD AUTO: 0.7 % (ref 0–0.5)
LYMPHOCYTES # BLD AUTO: 1.23 10*3/MM3 (ref 0.7–3.1)
LYMPHOCYTES NFR BLD AUTO: 16.9 % (ref 19.6–45.3)
MCH RBC QN AUTO: 29.6 PG (ref 26.6–33)
MCHC RBC AUTO-ENTMCNC: 33.7 G/DL (ref 31.5–35.7)
MCV RBC AUTO: 87.8 FL (ref 79–97)
MONOCYTES # BLD AUTO: 0.9 10*3/MM3 (ref 0.1–0.9)
MONOCYTES NFR BLD AUTO: 12.4 % (ref 5–12)
NEUTROPHILS NFR BLD AUTO: 5.01 10*3/MM3 (ref 1.7–7)
NEUTROPHILS NFR BLD AUTO: 69 % (ref 42.7–76)
NRBC BLD AUTO-RTO: 0 /100 WBC (ref 0–0.2)
PLATELET # BLD AUTO: 352 10*3/MM3 (ref 140–450)
PMV BLD AUTO: 11 FL (ref 6–12)
POTASSIUM SERPL-SCNC: 3.7 MMOL/L (ref 3.5–5.2)
PROT SERPL-MCNC: 8.2 G/DL (ref 6–8.5)
RBC # BLD AUTO: 5 10*6/MM3 (ref 3.77–5.28)
SODIUM SERPL-SCNC: 140 MMOL/L (ref 136–145)
T4 FREE SERPL-MCNC: 1.39 NG/DL (ref 0.93–1.7)
TSH SERPL DL<=0.05 MIU/L-ACNC: 0.42 UIU/ML (ref 0.27–4.2)
WBC # BLD AUTO: 7.26 10*3/MM3 (ref 3.4–10.8)

## 2021-08-17 PROBLEM — K12.1 MOUTH ULCER: Status: ACTIVE | Noted: 2021-08-17

## 2021-08-17 PROBLEM — B34.9 VIRAL ILLNESS: Status: ACTIVE | Noted: 2021-08-17

## 2021-10-29 RX ORDER — ESCITALOPRAM OXALATE 20 MG/1
20 TABLET ORAL DAILY
Qty: 30 TABLET | Refills: 0 | Status: SHIPPED | OUTPATIENT
Start: 2021-10-29 | End: 2021-12-07 | Stop reason: SDUPTHER

## 2021-11-26 ENCOUNTER — HOSPITAL ENCOUNTER (EMERGENCY)
Facility: HOSPITAL | Age: 41
Discharge: HOME | End: 2021-11-26
Attending: FAMILY MEDICINE | Admitting: FAMILY MEDICINE

## 2021-11-26 ENCOUNTER — APPOINTMENT (OUTPATIENT)
Dept: CT IMAGING | Facility: HOSPITAL | Age: 41
End: 2021-11-26

## 2021-11-26 VITALS
DIASTOLIC BLOOD PRESSURE: 63 MMHG | HEART RATE: 81 BPM | OXYGEN SATURATION: 99 % | TEMPERATURE: 98.8 F | SYSTOLIC BLOOD PRESSURE: 99 MMHG | RESPIRATION RATE: 20 BRPM

## 2021-11-26 DIAGNOSIS — K52.9 COLITIS, ACUTE: Primary | ICD-10-CM

## 2021-11-26 LAB
ALBUMIN SERPL-MCNC: 4.2 G/DL (ref 3.5–5.2)
ALBUMIN/GLOB SERPL: 1.4 G/DL
ALP SERPL-CCNC: 81 U/L (ref 39–117)
ALT SERPL W P-5'-P-CCNC: 21 U/L (ref 1–33)
ANION GAP SERPL CALCULATED.3IONS-SCNC: 10 MMOL/L (ref 5–15)
AST SERPL-CCNC: 17 U/L (ref 1–32)
BACTERIA UR QL AUTO: ABNORMAL /HPF
BASOPHILS # BLD AUTO: 0.04 10*3/MM3 (ref 0–0.2)
BASOPHILS NFR BLD AUTO: 0.4 % (ref 0–1.5)
BILIRUB SERPL-MCNC: 0.5 MG/DL (ref 0–1.2)
BILIRUB UR QL STRIP: NEGATIVE
BUN SERPL-MCNC: 17 MG/DL (ref 6–20)
BUN/CREAT SERPL: 23.9 (ref 7–25)
CALCIUM SPEC-SCNC: 9.4 MG/DL (ref 8.6–10.5)
CHLORIDE SERPL-SCNC: 104 MMOL/L (ref 98–107)
CLARITY UR: CLEAR
CO2 SERPL-SCNC: 26 MMOL/L (ref 22–29)
COLOR UR: YELLOW
CREAT SERPL-MCNC: 0.71 MG/DL (ref 0.57–1)
DEPRECATED RDW RBC AUTO: 41.9 FL (ref 37–54)
EOSINOPHIL # BLD AUTO: 0.07 10*3/MM3 (ref 0–0.4)
EOSINOPHIL NFR BLD AUTO: 0.7 % (ref 0.3–6.2)
ERYTHROCYTE [DISTWIDTH] IN BLOOD BY AUTOMATED COUNT: 12.8 % (ref 12.3–15.4)
GFR SERPL CREATININE-BSD FRML MDRD: 92 ML/MIN/1.73
GLOBULIN UR ELPH-MCNC: 3.1 GM/DL
GLUCOSE SERPL-MCNC: 101 MG/DL (ref 65–99)
GLUCOSE UR STRIP-MCNC: NEGATIVE MG/DL
HCT VFR BLD AUTO: 40.6 % (ref 34–46.6)
HGB BLD-MCNC: 13.9 G/DL (ref 12–15.9)
HGB UR QL STRIP.AUTO: ABNORMAL
HOLD SPECIMEN: NORMAL
HOLD SPECIMEN: NORMAL
HYALINE CASTS UR QL AUTO: ABNORMAL /LPF
IMM GRANULOCYTES # BLD AUTO: 0.04 10*3/MM3 (ref 0–0.05)
IMM GRANULOCYTES NFR BLD AUTO: 0.4 % (ref 0–0.5)
KETONES UR QL STRIP: NEGATIVE
LEUKOCYTE ESTERASE UR QL STRIP.AUTO: NEGATIVE
LIPASE SERPL-CCNC: 22 U/L (ref 13–60)
LYMPHOCYTES # BLD AUTO: 1.65 10*3/MM3 (ref 0.7–3.1)
LYMPHOCYTES NFR BLD AUTO: 15.9 % (ref 19.6–45.3)
MCH RBC QN AUTO: 30.6 PG (ref 26.6–33)
MCHC RBC AUTO-ENTMCNC: 34.2 G/DL (ref 31.5–35.7)
MCV RBC AUTO: 89.4 FL (ref 79–97)
MONOCYTES # BLD AUTO: 0.74 10*3/MM3 (ref 0.1–0.9)
MONOCYTES NFR BLD AUTO: 7.1 % (ref 5–12)
NEUTROPHILS NFR BLD AUTO: 7.82 10*3/MM3 (ref 1.7–7)
NEUTROPHILS NFR BLD AUTO: 75.5 % (ref 42.7–76)
NITRITE UR QL STRIP: NEGATIVE
NRBC BLD AUTO-RTO: 0 /100 WBC (ref 0–0.2)
PH UR STRIP.AUTO: 5.5 [PH] (ref 5–9)
PLATELET # BLD AUTO: 319 10*3/MM3 (ref 140–450)
PMV BLD AUTO: 9.9 FL (ref 6–12)
POTASSIUM SERPL-SCNC: 4 MMOL/L (ref 3.5–5.2)
PROT SERPL-MCNC: 7.3 G/DL (ref 6–8.5)
PROT UR QL STRIP: NEGATIVE
RBC # BLD AUTO: 4.54 10*6/MM3 (ref 3.77–5.28)
RBC # UR STRIP: ABNORMAL /HPF
REF LAB TEST METHOD: ABNORMAL
SODIUM SERPL-SCNC: 140 MMOL/L (ref 136–145)
SP GR UR STRIP: 1.02 (ref 1–1.03)
SQUAMOUS #/AREA URNS HPF: ABNORMAL /HPF
UROBILINOGEN UR QL STRIP: ABNORMAL
WBC # UR STRIP: ABNORMAL /HPF
WBC NRBC COR # BLD: 10.36 10*3/MM3 (ref 3.4–10.8)
WHOLE BLOOD HOLD SPECIMEN: NORMAL
WHOLE BLOOD HOLD SPECIMEN: NORMAL

## 2021-11-26 PROCEDURE — 96375 TX/PRO/DX INJ NEW DRUG ADDON: CPT

## 2021-11-26 PROCEDURE — 85025 COMPLETE CBC W/AUTO DIFF WBC: CPT

## 2021-11-26 PROCEDURE — 25010000002 MORPHINE PER 10 MG: Performed by: FAMILY MEDICINE

## 2021-11-26 PROCEDURE — 74177 CT ABD & PELVIS W/CONTRAST: CPT

## 2021-11-26 PROCEDURE — 96374 THER/PROPH/DIAG INJ IV PUSH: CPT

## 2021-11-26 PROCEDURE — 80053 COMPREHEN METABOLIC PANEL: CPT

## 2021-11-26 PROCEDURE — 25010000002 ONDANSETRON PER 1 MG: Performed by: FAMILY MEDICINE

## 2021-11-26 PROCEDURE — 81001 URINALYSIS AUTO W/SCOPE: CPT

## 2021-11-26 PROCEDURE — 83690 ASSAY OF LIPASE: CPT

## 2021-11-26 PROCEDURE — 99283 EMERGENCY DEPT VISIT LOW MDM: CPT

## 2021-11-26 PROCEDURE — 25010000002 IOPAMIDOL 61 % SOLUTION: Performed by: FAMILY MEDICINE

## 2021-11-26 RX ORDER — ONDANSETRON 2 MG/ML
4 INJECTION INTRAMUSCULAR; INTRAVENOUS ONCE
Status: COMPLETED | OUTPATIENT
Start: 2021-11-26 | End: 2021-11-26

## 2021-11-26 RX ORDER — CIPROFLOXACIN 500 MG/1
500 TABLET, FILM COATED ORAL 2 TIMES DAILY
Qty: 20 TABLET | Refills: 0 | Status: SHIPPED | OUTPATIENT
Start: 2021-11-26 | End: 2021-11-26 | Stop reason: SDUPTHER

## 2021-11-26 RX ORDER — ONDANSETRON 4 MG/1
4 TABLET, ORALLY DISINTEGRATING ORAL EVERY 6 HOURS PRN
Qty: 10 TABLET | Refills: 0 | Status: SHIPPED | OUTPATIENT
Start: 2021-11-26

## 2021-11-26 RX ORDER — ONDANSETRON 4 MG/1
4 TABLET, ORALLY DISINTEGRATING ORAL EVERY 6 HOURS PRN
Qty: 10 TABLET | Refills: 0 | Status: SHIPPED | OUTPATIENT
Start: 2021-11-26 | End: 2021-11-26 | Stop reason: SDUPTHER

## 2021-11-26 RX ORDER — SODIUM CHLORIDE 0.9 % (FLUSH) 0.9 %
10 SYRINGE (ML) INJECTION AS NEEDED
Status: DISCONTINUED | OUTPATIENT
Start: 2021-11-26 | End: 2021-11-26 | Stop reason: HOSPADM

## 2021-11-26 RX ORDER — DICYCLOMINE HCL 20 MG
20 TABLET ORAL EVERY 6 HOURS PRN
Qty: 30 TABLET | Refills: 0 | Status: SHIPPED | OUTPATIENT
Start: 2021-11-26 | End: 2022-01-21 | Stop reason: SDUPTHER

## 2021-11-26 RX ORDER — CIPROFLOXACIN 500 MG/1
500 TABLET, FILM COATED ORAL 2 TIMES DAILY
Qty: 20 TABLET | Refills: 0 | Status: SHIPPED | OUTPATIENT
Start: 2021-11-26 | End: 2021-12-07

## 2021-11-26 RX ORDER — DICYCLOMINE HCL 20 MG
20 TABLET ORAL EVERY 6 HOURS PRN
Qty: 30 TABLET | Refills: 0 | Status: SHIPPED | OUTPATIENT
Start: 2021-11-26 | End: 2021-11-26 | Stop reason: SDUPTHER

## 2021-11-26 RX ORDER — METRONIDAZOLE 500 MG/1
500 TABLET ORAL 4 TIMES DAILY
Qty: 40 TABLET | Refills: 0 | Status: SHIPPED | OUTPATIENT
Start: 2021-11-26 | End: 2021-11-26 | Stop reason: SDUPTHER

## 2021-11-26 RX ORDER — METRONIDAZOLE 500 MG/1
500 TABLET ORAL 4 TIMES DAILY
Qty: 40 TABLET | Refills: 0 | Status: SHIPPED | OUTPATIENT
Start: 2021-11-26 | End: 2021-12-07

## 2021-11-26 RX ADMIN — ONDANSETRON 4 MG: 2 INJECTION INTRAMUSCULAR; INTRAVENOUS at 15:45

## 2021-11-26 RX ADMIN — MORPHINE SULFATE 4 MG: 4 INJECTION INTRAVENOUS at 15:46

## 2021-11-26 RX ADMIN — IOPAMIDOL 90 ML: 612 INJECTION, SOLUTION INTRAVENOUS at 15:41

## 2021-12-07 ENCOUNTER — OFFICE VISIT (OUTPATIENT)
Dept: FAMILY MEDICINE CLINIC | Facility: CLINIC | Age: 41
End: 2021-12-07

## 2021-12-07 VITALS
WEIGHT: 125.31 LBS | BODY MASS INDEX: 23.06 KG/M2 | SYSTOLIC BLOOD PRESSURE: 108 MMHG | DIASTOLIC BLOOD PRESSURE: 60 MMHG | HEIGHT: 62 IN | HEART RATE: 83 BPM | OXYGEN SATURATION: 99 %

## 2021-12-07 DIAGNOSIS — Z87.898 HISTORY OF INSOMNIA: ICD-10-CM

## 2021-12-07 DIAGNOSIS — F33.1 MODERATE EPISODE OF RECURRENT MAJOR DEPRESSIVE DISORDER (HCC): Primary | ICD-10-CM

## 2021-12-07 DIAGNOSIS — Z87.19 HISTORY OF COLITIS: ICD-10-CM

## 2021-12-07 PROCEDURE — 99214 OFFICE O/P EST MOD 30 MIN: CPT | Performed by: FAMILY MEDICINE

## 2021-12-07 RX ORDER — ESCITALOPRAM OXALATE 20 MG/1
20 TABLET ORAL DAILY
Qty: 30 TABLET | Refills: 5 | Status: SHIPPED | OUTPATIENT
Start: 2021-12-07 | End: 2021-12-07 | Stop reason: SINTOL

## 2021-12-07 RX ORDER — CIPROFLOXACIN 250 MG/1
250 TABLET, FILM COATED ORAL 2 TIMES DAILY
Qty: 14 TABLET | Refills: 0 | Status: SHIPPED | OUTPATIENT
Start: 2021-12-07 | End: 2022-01-14

## 2021-12-07 RX ORDER — BUPROPION HYDROCHLORIDE 150 MG/1
TABLET, EXTENDED RELEASE ORAL
Qty: 60 TABLET | Refills: 2 | Status: SHIPPED | OUTPATIENT
Start: 2021-12-07 | End: 2022-02-14 | Stop reason: SDUPTHER

## 2021-12-07 NOTE — PROGRESS NOTES
Subjective   Mercy Johnson is a 41 y.o. female.   .Cc: follow up of chronic medical issues    Depression  Visit Type: follow-up  Patient presents with the following symptoms: depressed mood, excessive worry, hypersomnia, irritability, nervousness/anxiety and panic.  Patient is not experiencing: anhedonia, chest pain, choking sensation, compulsions, confusion, dizziness, feelings of hopelessness, feelings of worthlessness, hyperventilation, insomnia, memory impairment, palpitations, suicidal ideas, suicidal planning and thoughts of death.    Insomnia  This is a chronic problem. The current episode started more than 1 year ago. The problem occurs daily. Associated symptoms include abdominal pain, arthralgias, a change in bowel habit and nausea. Pertinent negatives include no anorexia, chest pain, chills, congestion, coughing, fatigue, headaches, joint swelling, neck pain, numbness, rash, sore throat, swollen glands or urinary symptoms.     She had a flair up of her Colitis. She ws treated with antibiotics.She is getting better.  The following portions of the patient's history were reviewed and updated as appropriate: allergies, current medications, past family history, past medical history, past social history, past surgical history and problem list.    Review of Systems   Constitutional: Positive for irritability. Negative for chills and fatigue.   HENT: Negative for congestion and sore throat.    Respiratory: Negative for cough and choking.    Cardiovascular: Negative for chest pain and palpitations.   Gastrointestinal: Positive for abdominal pain, change in bowel habit and nausea. Negative for anorexia.   Musculoskeletal: Positive for arthralgias. Negative for joint swelling and neck pain.   Skin: Negative for rash.   Neurological: Negative for numbness and headaches.   Psychiatric/Behavioral: Negative for confusion and suicidal ideas. The patient is nervous/anxious. The patient does not have insomnia.   "      Objective   Physical Exam  Vitals reviewed.   Constitutional:       Appearance: Normal appearance.   HENT:      Head: Normocephalic and atraumatic.      Right Ear: Tympanic membrane, ear canal and external ear normal.      Left Ear: Tympanic membrane, ear canal and external ear normal.      Nose: Nose normal.      Mouth/Throat:      Mouth: Mucous membranes are moist.      Pharynx: Oropharynx is clear.   Cardiovascular:      Rate and Rhythm: Normal rate and regular rhythm.      Heart sounds: Normal heart sounds. No murmur heard.  No friction rub. No gallop.    Pulmonary:      Effort: Pulmonary effort is normal. No respiratory distress.      Breath sounds: Normal breath sounds. No wheezing or rales.      Comments: Abdomen is soft. She was mildly tender over the suprapubic area.  Musculoskeletal:      Cervical back: Normal range of motion.   Skin:     General: Skin is warm and dry.   Neurological:      Mental Status: She is alert.           Visit Vitals  /60   Pulse 83   Ht 157.5 cm (62\")   Wt 56.8 kg (125 lb 5 oz)   LMP 01/05/2010 (Exact Date) Comment: HYSTO   SpO2 99%   Breastfeeding No   BMI 22.92 kg/m²     Body mass index is 22.92 kg/m².      Assessment/Plan   Diagnoses and all orders for this visit:    1. Moderate episode of recurrent major depressive disorder (HCC) (Primary)  -     Discontinue: escitalopram (LEXAPRO) 20 MG tablet; Take 1 tablet by mouth Daily.  Dispense: 30 tablet; Refill: 5    2. History of insomnia    3. History of colitis  -     Ambulatory Referral to Gastroenterology    Other orders  -     buPROPion SR (Wellbutrin SR) 150 MG 12 hr tablet; One daily for the first three days and then twice daily.  Dispense: 60 tablet; Refill: 2  -     ciprofloxacin (Cipro) 250 MG tablet; Take 1 tablet by mouth 2 (Two) Times a Day.  Dispense: 14 tablet; Refill: 0    I reviewed the CBC,CMP,and CR of the Abdomen with the patient..  Return to the clinic in 2 month/s.  Will contact with results as " needed.  Will continue on her Cipro. She has stopped the Flagul due to side effects.

## 2021-12-30 ENCOUNTER — OFFICE VISIT (OUTPATIENT)
Dept: GASTROENTEROLOGY | Facility: CLINIC | Age: 41
End: 2021-12-30

## 2021-12-30 VITALS
WEIGHT: 130 LBS | HEIGHT: 62 IN | SYSTOLIC BLOOD PRESSURE: 107 MMHG | DIASTOLIC BLOOD PRESSURE: 64 MMHG | BODY MASS INDEX: 23.92 KG/M2 | HEART RATE: 81 BPM

## 2021-12-30 DIAGNOSIS — R10.33 PERIUMBILICAL ABDOMINAL PAIN: ICD-10-CM

## 2021-12-30 DIAGNOSIS — R93.3 ABNORMAL FINDING ON GI TRACT IMAGING: ICD-10-CM

## 2021-12-30 DIAGNOSIS — R19.7 DIARRHEA, UNSPECIFIED TYPE: Primary | ICD-10-CM

## 2021-12-30 DIAGNOSIS — R11.0 NAUSEA: ICD-10-CM

## 2021-12-30 DIAGNOSIS — K52.9 COLITIS: ICD-10-CM

## 2021-12-30 DIAGNOSIS — K21.9 GASTROESOPHAGEAL REFLUX DISEASE, UNSPECIFIED WHETHER ESOPHAGITIS PRESENT: ICD-10-CM

## 2021-12-30 PROCEDURE — 99214 OFFICE O/P EST MOD 30 MIN: CPT | Performed by: PHYSICIAN ASSISTANT

## 2021-12-30 RX ORDER — HYDROCODONE BITARTRATE AND ACETAMINOPHEN 5; 325 MG/1; MG/1
TABLET ORAL SEE ADMIN INSTRUCTIONS
COMMUNITY
Start: 2021-12-09 | End: 2022-01-14

## 2021-12-30 RX ORDER — DEXTROSE AND SODIUM CHLORIDE 5; .45 G/100ML; G/100ML
30 INJECTION, SOLUTION INTRAVENOUS CONTINUOUS PRN
Status: CANCELLED | OUTPATIENT
Start: 2022-01-19

## 2022-01-14 RX ORDER — PHENOL 1.4 %
10 AEROSOL, SPRAY (ML) MUCOUS MEMBRANE AS NEEDED
COMMUNITY

## 2022-01-17 ENCOUNTER — LAB (OUTPATIENT)
Dept: LAB | Facility: HOSPITAL | Age: 42
End: 2022-01-17

## 2022-01-17 LAB — SARS-COV-2 N GENE RESP QL NAA+PROBE: NOT DETECTED

## 2022-01-17 PROCEDURE — C9803 HOPD COVID-19 SPEC COLLECT: HCPCS | Performed by: PHYSICIAN ASSISTANT

## 2022-01-17 PROCEDURE — 87635 SARS-COV-2 COVID-19 AMP PRB: CPT | Performed by: PHYSICIAN ASSISTANT

## 2022-01-19 ENCOUNTER — HOSPITAL ENCOUNTER (OUTPATIENT)
Facility: HOSPITAL | Age: 42
Setting detail: HOSPITAL OUTPATIENT SURGERY
Discharge: HOME | End: 2022-01-19
Attending: INTERNAL MEDICINE | Admitting: INTERNAL MEDICINE

## 2022-01-19 ENCOUNTER — ANESTHESIA (OUTPATIENT)
Dept: GASTROENTEROLOGY | Facility: HOSPITAL | Age: 42
End: 2022-01-19

## 2022-01-19 ENCOUNTER — ANESTHESIA EVENT (OUTPATIENT)
Dept: GASTROENTEROLOGY | Facility: HOSPITAL | Age: 42
End: 2022-01-19

## 2022-01-19 VITALS
OXYGEN SATURATION: 98 % | SYSTOLIC BLOOD PRESSURE: 107 MMHG | DIASTOLIC BLOOD PRESSURE: 78 MMHG | RESPIRATION RATE: 20 BRPM | WEIGHT: 129.2 LBS | HEART RATE: 72 BPM | HEIGHT: 62 IN | BODY MASS INDEX: 23.77 KG/M2 | TEMPERATURE: 97.3 F

## 2022-01-19 DIAGNOSIS — R19.7 DIARRHEA, UNSPECIFIED TYPE: ICD-10-CM

## 2022-01-19 DIAGNOSIS — K21.9 GASTROESOPHAGEAL REFLUX DISEASE, UNSPECIFIED WHETHER ESOPHAGITIS PRESENT: ICD-10-CM

## 2022-01-19 DIAGNOSIS — K52.9 COLITIS: ICD-10-CM

## 2022-01-19 DIAGNOSIS — R93.3 ABNORMAL FINDING ON GI TRACT IMAGING: ICD-10-CM

## 2022-01-19 DIAGNOSIS — R10.33 PERIUMBILICAL ABDOMINAL PAIN: ICD-10-CM

## 2022-01-19 DIAGNOSIS — R11.0 NAUSEA: ICD-10-CM

## 2022-01-19 PROCEDURE — 43239 EGD BIOPSY SINGLE/MULTIPLE: CPT | Performed by: INTERNAL MEDICINE

## 2022-01-19 PROCEDURE — 25010000002 PROPOFOL 10 MG/ML EMULSION: Performed by: NURSE ANESTHETIST, CERTIFIED REGISTERED

## 2022-01-19 PROCEDURE — 45380 COLONOSCOPY AND BIOPSY: CPT | Performed by: INTERNAL MEDICINE

## 2022-01-19 PROCEDURE — 45385 COLONOSCOPY W/LESION REMOVAL: CPT | Performed by: INTERNAL MEDICINE

## 2022-01-19 RX ORDER — LIDOCAINE HYDROCHLORIDE 20 MG/ML
INJECTION, SOLUTION INTRAVENOUS AS NEEDED
Status: DISCONTINUED | OUTPATIENT
Start: 2022-01-19 | End: 2022-01-19 | Stop reason: SURG

## 2022-01-19 RX ORDER — ONDANSETRON 2 MG/ML
4 INJECTION INTRAMUSCULAR; INTRAVENOUS ONCE AS NEEDED
Status: DISCONTINUED | OUTPATIENT
Start: 2022-01-19 | End: 2022-01-19 | Stop reason: HOSPADM

## 2022-01-19 RX ORDER — DEXTROSE AND SODIUM CHLORIDE 5; .45 G/100ML; G/100ML
30 INJECTION, SOLUTION INTRAVENOUS CONTINUOUS PRN
Status: DISCONTINUED | OUTPATIENT
Start: 2022-01-19 | End: 2022-01-19 | Stop reason: HOSPADM

## 2022-01-19 RX ORDER — PROPOFOL 10 MG/ML
VIAL (ML) INTRAVENOUS AS NEEDED
Status: DISCONTINUED | OUTPATIENT
Start: 2022-01-19 | End: 2022-01-19 | Stop reason: SURG

## 2022-01-19 RX ADMIN — PROPOFOL 20 MG: 10 INJECTION, EMULSION INTRAVENOUS at 10:44

## 2022-01-19 RX ADMIN — LIDOCAINE HYDROCHLORIDE 80 MG: 20 INJECTION, SOLUTION INTRAVENOUS at 10:38

## 2022-01-19 RX ADMIN — PROPOFOL 20 MG: 10 INJECTION, EMULSION INTRAVENOUS at 10:40

## 2022-01-19 RX ADMIN — PROPOFOL 100 MG: 10 INJECTION, EMULSION INTRAVENOUS at 10:38

## 2022-01-19 RX ADMIN — PROPOFOL 20 MG: 10 INJECTION, EMULSION INTRAVENOUS at 10:46

## 2022-01-19 RX ADMIN — PROPOFOL 50 MG: 10 INJECTION, EMULSION INTRAVENOUS at 10:52

## 2022-01-19 RX ADMIN — DEXTROSE AND SODIUM CHLORIDE: 5; 450 INJECTION, SOLUTION INTRAVENOUS at 10:46

## 2022-01-19 RX ADMIN — DEXTROSE AND SODIUM CHLORIDE: 5; 450 INJECTION, SOLUTION INTRAVENOUS at 10:00

## 2022-01-19 RX ADMIN — PROPOFOL 20 MG: 10 INJECTION, EMULSION INTRAVENOUS at 10:42

## 2022-01-19 RX ADMIN — PROPOFOL 20 MG: 10 INJECTION, EMULSION INTRAVENOUS at 10:48

## 2022-01-19 NOTE — ANESTHESIA PREPROCEDURE EVALUATION
Anesthesia Evaluation     NPO Solid Status: > 8 hours  NPO Liquid Status: > 8 hours           Airway   Mallampati: III  TM distance: >3 FB  Neck ROM: full  Possible difficult intubation  Dental    (+) poor dentition    Pulmonary - normal exam   (+) a smoker Current Smoked day of surgery, asthma,  Cardiovascular - normal exam        Neuro/Psych  (+) psychiatric history Anxiety, Depression and Bipolar,     GI/Hepatic/Renal/Endo    (+)  GERD well controlled,      ROS Comment: With ocassional n/v    Musculoskeletal     (+) neck pain,   Abdominal  - normal exam   Substance History      OB/GYN          Other                      Anesthesia Plan    ASA 2     MAC     intravenous induction     Anesthetic plan, all risks, benefits, and alternatives have been provided, discussed and informed consent has been obtained with: patient.    Plan discussed with CRNA.        CODE STATUS:

## 2022-01-19 NOTE — ANESTHESIA POSTPROCEDURE EVALUATION
Patient: Mercy Johnson    Procedure Summary     Date: 01/19/22 Room / Location: [facility] ENDOSCOPY 1 / [facility] ENDOSCOPY    Anesthesia Start: 1030 Anesthesia Stop: 1057    Procedures:       ESOPHAGOGASTRODUODENOSCOPY--bx (N/A )      COLONOSCOPY--bx, look TI (N/A ) Diagnosis:       Diarrhea, unspecified type      Abnormal finding on GI tract imaging      Periumbilical abdominal pain      Nausea      Colitis      Gastroesophageal reflux disease, unspecified whether esophagitis present      (Diarrhea, unspecified type [R19.7])      (Abnormal finding on GI tract imaging [R93.3])      (Periumbilical abdominal pain [R10.33])      (Nausea [R11.0])      (Colitis [K52.9])      (Gastroesophageal reflux disease, unspecified whether esophagitis present [K21.9])    Surgeons: Sathish Pineda MD Provider:     Anesthesia Type: MAC ASA Status: 2          Anesthesia Type: MAC    Vitals  No vitals data found for the desired time range.          Post Anesthesia Care and Evaluation    Patient location during evaluation: bedside  Patient participation: complete - patient participated  Level of consciousness: sleepy but conscious  Pain score: 0  Pain management: adequate  Airway patency: patent  Anesthetic complications: No anesthetic complications  PONV Status: none  Cardiovascular status: acceptable and hemodynamically stable  Respiratory status: acceptable, spontaneous ventilation and room air  Hydration status: acceptable

## 2022-01-21 LAB
LAB AP CASE REPORT: NORMAL
PATH REPORT.FINAL DX SPEC: NORMAL

## 2022-01-21 RX ORDER — DICYCLOMINE HCL 20 MG
20 TABLET ORAL EVERY 6 HOURS PRN
Qty: 30 TABLET | Refills: 0 | Status: SHIPPED | OUTPATIENT
Start: 2022-01-21 | End: 2022-01-26 | Stop reason: DRUGHIGH

## 2022-01-26 ENCOUNTER — OFFICE VISIT (OUTPATIENT)
Dept: GASTROENTEROLOGY | Facility: CLINIC | Age: 42
End: 2022-01-26

## 2022-01-26 VITALS
WEIGHT: 131.2 LBS | SYSTOLIC BLOOD PRESSURE: 110 MMHG | BODY MASS INDEX: 23.25 KG/M2 | DIASTOLIC BLOOD PRESSURE: 76 MMHG | HEIGHT: 63 IN | HEART RATE: 74 BPM

## 2022-01-26 DIAGNOSIS — Z87.19 HISTORY OF COLITIS: ICD-10-CM

## 2022-01-26 DIAGNOSIS — R10.10 PAIN OF UPPER ABDOMEN: ICD-10-CM

## 2022-01-26 DIAGNOSIS — R19.7 DIARRHEA, UNSPECIFIED TYPE: ICD-10-CM

## 2022-01-26 DIAGNOSIS — K21.00 GASTROESOPHAGEAL REFLUX DISEASE WITH ESOPHAGITIS WITHOUT HEMORRHAGE: Primary | ICD-10-CM

## 2022-01-26 PROCEDURE — 99214 OFFICE O/P EST MOD 30 MIN: CPT | Performed by: PHYSICIAN ASSISTANT

## 2022-01-26 RX ORDER — DICYCLOMINE HCL 20 MG
20 TABLET ORAL 2 TIMES DAILY
Qty: 60 TABLET | Refills: 3 | Status: SHIPPED | OUTPATIENT
Start: 2022-01-26 | End: 2022-09-21 | Stop reason: SDUPTHER

## 2022-01-26 RX ORDER — OMEPRAZOLE 40 MG/1
40 CAPSULE, DELAYED RELEASE ORAL DAILY
Qty: 30 CAPSULE | Refills: 3 | Status: SHIPPED | OUTPATIENT
Start: 2022-01-26 | End: 2022-02-28 | Stop reason: SINTOL

## 2022-02-14 ENCOUNTER — OFFICE VISIT (OUTPATIENT)
Dept: FAMILY MEDICINE CLINIC | Facility: CLINIC | Age: 42
End: 2022-02-14

## 2022-02-14 ENCOUNTER — LAB (OUTPATIENT)
Dept: LAB | Facility: HOSPITAL | Age: 42
End: 2022-02-14

## 2022-02-14 VITALS
WEIGHT: 127.5 LBS | SYSTOLIC BLOOD PRESSURE: 124 MMHG | HEIGHT: 63 IN | BODY MASS INDEX: 22.59 KG/M2 | DIASTOLIC BLOOD PRESSURE: 76 MMHG | OXYGEN SATURATION: 98 % | HEART RATE: 77 BPM

## 2022-02-14 DIAGNOSIS — F33.9 RECURRENT MAJOR DEPRESSIVE DISORDER, REMISSION STATUS UNSPECIFIED: Primary | ICD-10-CM

## 2022-02-14 DIAGNOSIS — F33.9 RECURRENT MAJOR DEPRESSIVE DISORDER, REMISSION STATUS UNSPECIFIED: ICD-10-CM

## 2022-02-14 DIAGNOSIS — G47.00 INSOMNIA, UNSPECIFIED TYPE: ICD-10-CM

## 2022-02-14 LAB
ALBUMIN SERPL-MCNC: 4.7 G/DL (ref 3.5–5.2)
ALBUMIN/GLOB SERPL: 2 G/DL
ALP SERPL-CCNC: 62 U/L (ref 39–117)
ALT SERPL W P-5'-P-CCNC: 32 U/L (ref 1–33)
ANION GAP SERPL CALCULATED.3IONS-SCNC: 10.1 MMOL/L (ref 5–15)
AST SERPL-CCNC: 23 U/L (ref 1–32)
BASOPHILS # BLD AUTO: 0.04 10*3/MM3 (ref 0–0.2)
BASOPHILS NFR BLD AUTO: 0.7 % (ref 0–1.5)
BILIRUB SERPL-MCNC: 0.3 MG/DL (ref 0–1.2)
BUN SERPL-MCNC: 18 MG/DL (ref 6–20)
BUN/CREAT SERPL: 22.5 (ref 7–25)
CALCIUM SPEC-SCNC: 9.5 MG/DL (ref 8.6–10.5)
CHLORIDE SERPL-SCNC: 100 MMOL/L (ref 98–107)
CO2 SERPL-SCNC: 25.9 MMOL/L (ref 22–29)
CREAT SERPL-MCNC: 0.8 MG/DL (ref 0.57–1)
DEPRECATED RDW RBC AUTO: 42.4 FL (ref 37–54)
EOSINOPHIL # BLD AUTO: 0.11 10*3/MM3 (ref 0–0.4)
EOSINOPHIL NFR BLD AUTO: 1.8 % (ref 0.3–6.2)
ERYTHROCYTE [DISTWIDTH] IN BLOOD BY AUTOMATED COUNT: 12.9 % (ref 12.3–15.4)
GFR SERPL CREATININE-BSD FRML MDRD: 80 ML/MIN/1.73
GLOBULIN UR ELPH-MCNC: 2.4 GM/DL
GLUCOSE SERPL-MCNC: 91 MG/DL (ref 65–99)
HCT VFR BLD AUTO: 42.2 % (ref 34–46.6)
HGB BLD-MCNC: 14.1 G/DL (ref 12–15.9)
IMM GRANULOCYTES # BLD AUTO: 0.02 10*3/MM3 (ref 0–0.05)
IMM GRANULOCYTES NFR BLD AUTO: 0.3 % (ref 0–0.5)
LYMPHOCYTES # BLD AUTO: 1.59 10*3/MM3 (ref 0.7–3.1)
LYMPHOCYTES NFR BLD AUTO: 26 % (ref 19.6–45.3)
MCH RBC QN AUTO: 30.2 PG (ref 26.6–33)
MCHC RBC AUTO-ENTMCNC: 33.4 G/DL (ref 31.5–35.7)
MCV RBC AUTO: 90.4 FL (ref 79–97)
MONOCYTES # BLD AUTO: 0.4 10*3/MM3 (ref 0.1–0.9)
MONOCYTES NFR BLD AUTO: 6.5 % (ref 5–12)
NEUTROPHILS NFR BLD AUTO: 3.95 10*3/MM3 (ref 1.7–7)
NEUTROPHILS NFR BLD AUTO: 64.7 % (ref 42.7–76)
NRBC BLD AUTO-RTO: 0 /100 WBC (ref 0–0.2)
PLATELET # BLD AUTO: 295 10*3/MM3 (ref 140–450)
PMV BLD AUTO: 11 FL (ref 6–12)
POTASSIUM SERPL-SCNC: 4.2 MMOL/L (ref 3.5–5.2)
PROT SERPL-MCNC: 7.1 G/DL (ref 6–8.5)
RBC # BLD AUTO: 4.67 10*6/MM3 (ref 3.77–5.28)
SODIUM SERPL-SCNC: 136 MMOL/L (ref 136–145)
WBC NRBC COR # BLD: 6.11 10*3/MM3 (ref 3.4–10.8)

## 2022-02-14 PROCEDURE — 85025 COMPLETE CBC W/AUTO DIFF WBC: CPT

## 2022-02-14 PROCEDURE — 80053 COMPREHEN METABOLIC PANEL: CPT

## 2022-02-14 PROCEDURE — 36415 COLL VENOUS BLD VENIPUNCTURE: CPT

## 2022-02-14 PROCEDURE — 99214 OFFICE O/P EST MOD 30 MIN: CPT | Performed by: FAMILY MEDICINE

## 2022-02-14 RX ORDER — BUPROPION HYDROCHLORIDE 150 MG/1
TABLET, EXTENDED RELEASE ORAL
Qty: 60 TABLET | Refills: 2 | Status: SHIPPED | OUTPATIENT
Start: 2022-02-14 | End: 2022-05-19

## 2022-02-14 RX ORDER — ARIPIPRAZOLE 5 MG/1
5 TABLET ORAL NIGHTLY
Qty: 30 TABLET | Refills: 2 | Status: SHIPPED | OUTPATIENT
Start: 2022-02-14 | End: 2022-05-19 | Stop reason: SDUPTHER

## 2022-02-14 RX ORDER — BUPROPION HYDROCHLORIDE 150 MG/1
150 TABLET, EXTENDED RELEASE ORAL 2 TIMES DAILY
Qty: 60 TABLET | Refills: 4 | Status: CANCELLED | OUTPATIENT
Start: 2022-02-14

## 2022-02-14 NOTE — PROGRESS NOTES
Subjective   Mercy Johnson is a 41 y.o. female.     Depression  Visit Type: follow-up  Patient presents with the following symptoms: decreased concentration, depressed mood, excessive worry, fatigue, feelings of hopelessness, insomnia, nervousness/anxiety, restlessness, suicidal ideas, suicidal planning and thoughts of death.  Patient is not experiencing: anhedonia, chest pain, choking sensation, compulsions, confusion, dizziness, dry mouth, feelings of worthlessness, hypersomnia, hyperventilation, irritability, malaise, memory impairment, muscle tension, nausea, obsessions, palpitations, shortness of breath, weight gain and weight loss.    Insomnia  This is a chronic problem. The current episode started more than 1 year ago. The problem occurs daily. Associated symptoms include abdominal pain, a change in bowel habit and headaches. Pertinent negatives include no anorexia, arthralgias, chest pain, chills, congestion, coughing, diaphoresis, fatigue, fever, joint swelling, myalgias, nausea, neck pain, numbness, rash, sore throat, swollen glands, urinary symptoms, vertigo, visual change or vomiting.       The following portions of the patient's history were reviewed and updated as appropriate: allergies, current medications, past family history, past medical history, past social history, past surgical history and problem list.    Review of Systems   Constitutional: Negative for chills, diaphoresis, fatigue, fever, irritability, weight gain and weight loss.   HENT: Negative for congestion and sore throat.    Respiratory: Negative for cough, choking and shortness of breath.    Cardiovascular: Negative for chest pain and palpitations.   Gastrointestinal: Positive for abdominal pain and change in bowel habit. Negative for anorexia, nausea and vomiting.   Musculoskeletal: Negative for arthralgias, joint swelling, myalgias and neck pain.   Skin: Negative for rash.   Neurological: Positive for headaches. Negative for  "vertigo and numbness.   Psychiatric/Behavioral: Positive for decreased concentration and suicidal ideas. Negative for confusion. The patient is nervous/anxious and has insomnia.        Objective   Physical Exam  Vitals reviewed.   Constitutional:       Appearance: Normal appearance.   HENT:      Head: Normocephalic and atraumatic.      Right Ear: Tympanic membrane, ear canal and external ear normal.      Left Ear: Tympanic membrane, ear canal and external ear normal.      Nose: Nose normal.      Mouth/Throat:      Mouth: Mucous membranes are moist.   Cardiovascular:      Rate and Rhythm: Normal rate and regular rhythm.      Heart sounds: Normal heart sounds. No murmur heard.  No friction rub. No gallop.    Pulmonary:      Effort: Pulmonary effort is normal. No respiratory distress.      Breath sounds: Normal breath sounds. No stridor. No wheezing, rhonchi or rales.   Abdominal:      General: Abdomen is flat. Bowel sounds are normal. There is no distension.      Palpations: Abdomen is soft. There is no mass.      Tenderness: There is no abdominal tenderness. There is no guarding or rebound.      Hernia: No hernia is present.   Musculoskeletal:      Cervical back: Normal range of motion.      Comments: Back is non tender.   Skin:     General: Skin is warm and dry.   Neurological:      Mental Status: She is alert.           Visit Vitals  /76   Pulse 77   Ht 160 cm (63\")   Wt 57.8 kg (127 lb 8 oz)   LMP 01/05/2010 (Exact Date) Comment: HYSTO   SpO2 98%   Breastfeeding No   BMI 22.59 kg/m²     Body mass index is 22.59 kg/m².      Assessment/Plan   Diagnoses and all orders for this visit:    1. Recurrent major depressive disorder, remission status unspecified (HCC) (Primary)  -     Comprehensive metabolic panel; Future  -     CBC w AUTO Differential; Future    2. Insomnia, unspecified type    Other orders  -     ARIPiprazole (Abilify) 5 MG tablet; Take 1 tablet by mouth Every Night.  Dispense: 30 tablet; Refill: " 2  -     buPROPion SR (Wellbutrin SR) 150 MG 12 hr tablet; One daily for the first three days and then twice daily.  Dispense: 60 tablet; Refill: 2    I reviewed the TSH,Free T-4, CBC,CMP,and Lipid Profile with the patient.  Return to the clinic in 3 month/s.  Will contact with results as needed.  .

## 2022-02-28 ENCOUNTER — OFFICE VISIT (OUTPATIENT)
Dept: GASTROENTEROLOGY | Facility: CLINIC | Age: 42
End: 2022-02-28

## 2022-02-28 VITALS
DIASTOLIC BLOOD PRESSURE: 67 MMHG | HEART RATE: 81 BPM | BODY MASS INDEX: 23.85 KG/M2 | HEIGHT: 63 IN | SYSTOLIC BLOOD PRESSURE: 106 MMHG | WEIGHT: 134.6 LBS

## 2022-02-28 DIAGNOSIS — R19.7 DIARRHEA, UNSPECIFIED TYPE: ICD-10-CM

## 2022-02-28 DIAGNOSIS — K21.00 GASTROESOPHAGEAL REFLUX DISEASE WITH ESOPHAGITIS WITHOUT HEMORRHAGE: ICD-10-CM

## 2022-02-28 DIAGNOSIS — R10.10 PAIN OF UPPER ABDOMEN: Primary | ICD-10-CM

## 2022-02-28 PROCEDURE — 99213 OFFICE O/P EST LOW 20 MIN: CPT | Performed by: PHYSICIAN ASSISTANT

## 2022-02-28 RX ORDER — ESOMEPRAZOLE MAGNESIUM 40 MG/1
40 CAPSULE, DELAYED RELEASE ORAL DAILY
Qty: 30 CAPSULE | Refills: 3 | Status: SHIPPED | OUTPATIENT
Start: 2022-02-28 | End: 2022-06-29

## 2022-03-01 ENCOUNTER — HOSPITAL ENCOUNTER (EMERGENCY)
Facility: HOSPITAL | Age: 42
Discharge: HOME | End: 2022-03-01
Attending: EMERGENCY MEDICINE | Admitting: EMERGENCY MEDICINE

## 2022-03-01 ENCOUNTER — APPOINTMENT (OUTPATIENT)
Dept: GENERAL RADIOLOGY | Facility: HOSPITAL | Age: 42
End: 2022-03-01

## 2022-03-01 VITALS
RESPIRATION RATE: 22 BRPM | WEIGHT: 130 LBS | HEIGHT: 62 IN | OXYGEN SATURATION: 99 % | HEART RATE: 92 BPM | TEMPERATURE: 97.5 F | DIASTOLIC BLOOD PRESSURE: 60 MMHG | SYSTOLIC BLOOD PRESSURE: 111 MMHG | BODY MASS INDEX: 23.92 KG/M2

## 2022-03-01 DIAGNOSIS — M54.6 ACUTE LEFT-SIDED THORACIC BACK PAIN: Primary | ICD-10-CM

## 2022-03-01 PROCEDURE — 71101 X-RAY EXAM UNILAT RIBS/CHEST: CPT

## 2022-03-01 PROCEDURE — 96372 THER/PROPH/DIAG INJ SC/IM: CPT

## 2022-03-01 PROCEDURE — 99283 EMERGENCY DEPT VISIT LOW MDM: CPT

## 2022-03-01 PROCEDURE — 25010000002 KETOROLAC TROMETHAMINE PER 15 MG: Performed by: NURSE PRACTITIONER

## 2022-03-01 PROCEDURE — 25010000002 METHYLPREDNISOLONE PER 80 MG: Performed by: NURSE PRACTITIONER

## 2022-03-01 RX ORDER — METHYLPREDNISOLONE ACETATE 80 MG/ML
80 INJECTION, SUSPENSION INTRA-ARTICULAR; INTRALESIONAL; INTRAMUSCULAR; SOFT TISSUE ONCE
Status: COMPLETED | OUTPATIENT
Start: 2022-03-01 | End: 2022-03-01

## 2022-03-01 RX ORDER — KETOROLAC TROMETHAMINE 30 MG/ML
60 INJECTION, SOLUTION INTRAMUSCULAR; INTRAVENOUS ONCE
Status: COMPLETED | OUTPATIENT
Start: 2022-03-01 | End: 2022-03-01

## 2022-03-01 RX ORDER — ORPHENADRINE CITRATE 100 MG/1
100 TABLET, EXTENDED RELEASE ORAL 2 TIMES DAILY
Qty: 10 TABLET | Refills: 0 | Status: SHIPPED | OUTPATIENT
Start: 2022-03-01 | End: 2022-06-29

## 2022-03-01 RX ADMIN — METHYLPREDNISOLONE ACETATE 80 MG: 80 INJECTION, SUSPENSION INTRA-ARTICULAR; INTRALESIONAL; INTRAMUSCULAR; SOFT TISSUE at 11:05

## 2022-03-01 RX ADMIN — KETOROLAC TROMETHAMINE 60 MG: 30 INJECTION, SOLUTION INTRAMUSCULAR at 11:04

## 2022-03-01 NOTE — DISCHARGE INSTRUCTIONS
Fill your prescription and take as directed. Heating pad to the area for comfort. Follow up with your primary care provider for reevaluation, call today for appointment. Return to ER as needed.

## 2022-03-01 NOTE — ED PROVIDER NOTES
Subjective   Patient states she woke up at 0400 with pain to her left shoulder blade that is worse with movement and palpation. It radiates down into her left arm (pain to arm has improved). Medications include Aleve at 0430. She was able to go back to sleep up work up with the pain still there. Pain is 7/10 currently.           Review of Systems   Respiratory: Negative for shortness of breath.    Cardiovascular: Negative for chest pain.   Gastrointestinal: Negative for abdominal pain.   Musculoskeletal:        Left shoulder blade pain       Past Medical History:   Diagnosis Date   • Acute gingivitis, plaque induced    • Allergic rhinitis     Claritin, doing well      • Asthma    • Attention deficit hyperactivity disorder, predominantly inattentive type    • Backache     Chronic, on Narcotics, helping      • Candidiasis of mouth    • Constipation    • Depressive disorder    • Diarrhea    • Low back pain    • Medication monitoring encounter      UDS ordered w/ confirmatory test      • Medication monitoring encounter     UDS ordered w/ confirmatory test      • Nausea and vomiting    • Pain and other symptoms associated with female genital organs    • Pain in throat    • Thoracic region somatic dysfunction     Controlled with Percocet only      • Tobacco dependence syndrome    • Upper respiratory infection    • Vulvovaginitis        Allergies   Allergen Reactions   • Amoxicillin Anaphylaxis   • Penicillins Anaphylaxis       Past Surgical History:   Procedure Laterality Date   • COLONOSCOPY N/A 1/19/2022    Procedure: COLONOSCOPY--bx, look TI;  Surgeon: Sathish Pineda MD;  Location: [facility] ENDOSCOPY;  Service: Gastroenterology;  Laterality: N/A;   • ENDOSCOPY N/A 1/19/2022    Procedure: ESOPHAGOGASTRODUODENOSCOPY--bx;  Surgeon: Sathish Pineda MD;  Location: [facility] ENDOSCOPY;  Service: Gastroenterology;  Laterality: N/A;   • HYSTERECTOMY     • INJECTION OF MEDICATION  01/19/2011    kenalog(1)    • INJECTION OF  MEDICATION  09/14/2011    phenergan(1)    • UPPER GASTROINTESTINAL ENDOSCOPY  01/19/2022       Family History   Problem Relation Age of Onset   • Thyroid nodules Maternal Grandmother    • Drug abuse Maternal Grandmother    • Anxiety disorder Maternal Grandmother    • Arthritis Maternal Grandmother    • Depression Maternal Grandmother    • Thyroid disease Maternal Grandmother    • Drug abuse Mother    • Anxiety disorder Mother    • Arthritis Mother    • Asthma Mother    • Depression Mother    • Mental illness Mother    • Anxiety disorder Father    • Depression Father    • Mental illness Father    • Drug abuse Brother    • Anxiety disorder Paternal Aunt    • Drug abuse Paternal Uncle    • Anxiety disorder Paternal Uncle    • Hearing loss Paternal Uncle    • Diabetes Paternal Grandfather    • Hearing loss Paternal Grandfather    • Mental illness Paternal Grandfather        Social History     Socioeconomic History   • Marital status: Single   Tobacco Use   • Smoking status: Current Every Day Smoker     Packs/day: 1.00     Years: 25.00     Pack years: 25.00     Types: Cigarettes   • Smokeless tobacco: Never Used   Vaping Use   • Vaping Use: Never used   Substance and Sexual Activity   • Alcohol use: Not Currently     Comment: hard liquor; Has 1 drink/week on average, RARE   • Drug use: Yes     Types: Marijuana     Comment: quit Meth 2x 2 years.   • Sexual activity: Yes     Partners: Male           Objective   Physical Exam  Constitutional:       General: She is not in acute distress.     Appearance: She is not ill-appearing.   HENT:      Head: Normocephalic and atraumatic.   Cardiovascular:      Rate and Rhythm: Normal rate and regular rhythm.      Pulses: Normal pulses.      Heart sounds: Normal heart sounds.   Pulmonary:      Effort: Pulmonary effort is normal.      Breath sounds: Normal breath sounds.   Musculoskeletal:         General: Tenderness present. Normal range of motion.      Cervical back: Normal range of  motion and neck supple. No tenderness.        Back:       Comments: Tenderness   Skin:     General: Skin is warm and dry.      Capillary Refill: Capillary refill takes less than 2 seconds.   Neurological:      Mental Status: She is alert and oriented to person, place, and time.   Psychiatric:         Mood and Affect: Mood normal.         Behavior: Behavior normal.         Thought Content: Thought content normal.         Judgment: Judgment normal.         Procedures  XR Ribs Left With PA Chest    Result Date: 3/1/2022  Narrative: Left RIBS with single view chest HISTORY: Posterior upper left rib pain. No known injury. Radiographs left hemithorax and upright PA film of the chest obtained. COMPARISON: None FINDINGS:  No left rib fracture identified. No osteolytic or osteoblastic lesion. The lungs are clear of an acute process. Old granulomatous disease is present. The heart is not enlarged. The pulmonary vasculature is not increased. No pleural effusion. No pneumothorax. No acute osseous abnormality.     Impression: CONCLUSION: Negative left RIBS. No acute disease in the chest. [id number] Electronically signed by:  Live Metzger MD  3/1/2022 11:06 AM CST Workstation: [id number]             ED Course                                                 MDM    Final diagnoses:   Acute left-sided thoracic back pain       ED Disposition  ED Disposition     ED Disposition Condition Comment    Discharge Stable           Dontae Andersen MD  [address] [facility] [address] [phone]    Schedule an appointment as soon as possible for a visit            Medication List      New Prescriptions    orphenadrine 100 MG 12 hr tablet  Commonly known as: NORFLEX  Take 1 tablet by mouth 2 (Two) Times a Day.        Changed    buPROPion  MG 12 hr tablet  Commonly known as: Wellbutrin SR  One daily for the first three days and then twice daily.  What changed:   · how much to take  · how to take this  · when to take this            Where to Get Your Medications      These medications were sent to [facility] #[id number] - [facility] [address] AT [facility] [address] - [phone] [facility] [phone]   [address] [address]    Phone: [phone]   · orphenadrine 100 MG 12 hr tablet          Chanel Ho APRN  03/01/22 [id number]

## 2022-03-28 ENCOUNTER — OFFICE VISIT (OUTPATIENT)
Dept: GASTROENTEROLOGY | Facility: CLINIC | Age: 42
End: 2022-03-28

## 2022-03-28 VITALS
SYSTOLIC BLOOD PRESSURE: 97 MMHG | HEIGHT: 63 IN | WEIGHT: 129.6 LBS | BODY MASS INDEX: 22.96 KG/M2 | DIASTOLIC BLOOD PRESSURE: 66 MMHG | HEART RATE: 92 BPM

## 2022-03-28 DIAGNOSIS — K21.00 GASTROESOPHAGEAL REFLUX DISEASE WITH ESOPHAGITIS WITHOUT HEMORRHAGE: ICD-10-CM

## 2022-03-28 DIAGNOSIS — R19.7 DIARRHEA, UNSPECIFIED TYPE: ICD-10-CM

## 2022-03-28 DIAGNOSIS — R10.10 PAIN OF UPPER ABDOMEN: Primary | ICD-10-CM

## 2022-03-28 PROCEDURE — 99213 OFFICE O/P EST LOW 20 MIN: CPT | Performed by: PHYSICIAN ASSISTANT

## 2022-03-28 RX ORDER — OMEPRAZOLE 40 MG/1
40 CAPSULE, DELAYED RELEASE ORAL DAILY
COMMUNITY
Start: 2022-03-27 | End: 2022-05-19

## 2022-05-19 RX ORDER — OMEPRAZOLE 40 MG/1
CAPSULE, DELAYED RELEASE ORAL
Qty: 30 CAPSULE | Refills: 1 | Status: SHIPPED | OUTPATIENT
Start: 2022-05-19 | End: 2022-07-18 | Stop reason: SDUPTHER

## 2022-05-19 RX ORDER — ARIPIPRAZOLE 5 MG/1
5 TABLET ORAL NIGHTLY
Qty: 30 TABLET | Refills: 2 | Status: SHIPPED | OUTPATIENT
Start: 2022-05-19 | End: 2022-08-23 | Stop reason: SDUPTHER

## 2022-05-19 RX ORDER — BUPROPION HYDROCHLORIDE 150 MG/1
TABLET, EXTENDED RELEASE ORAL
Qty: 60 TABLET | Refills: 2 | Status: SHIPPED | OUTPATIENT
Start: 2022-05-19 | End: 2022-08-17

## 2022-06-06 ENCOUNTER — OFFICE VISIT (OUTPATIENT)
Dept: FAMILY MEDICINE CLINIC | Facility: CLINIC | Age: 42
End: 2022-06-06

## 2022-06-06 VITALS
BODY MASS INDEX: 23.22 KG/M2 | HEART RATE: 83 BPM | HEIGHT: 63 IN | OXYGEN SATURATION: 99 % | DIASTOLIC BLOOD PRESSURE: 70 MMHG | SYSTOLIC BLOOD PRESSURE: 116 MMHG | WEIGHT: 131.06 LBS

## 2022-06-06 DIAGNOSIS — F41.9 ANXIETY: ICD-10-CM

## 2022-06-06 DIAGNOSIS — G47.00 INSOMNIA, UNSPECIFIED TYPE: ICD-10-CM

## 2022-06-06 DIAGNOSIS — F33.9 RECURRENT MAJOR DEPRESSIVE DISORDER, REMISSION STATUS UNSPECIFIED: Primary | ICD-10-CM

## 2022-06-06 PROCEDURE — 99214 OFFICE O/P EST MOD 30 MIN: CPT | Performed by: FAMILY MEDICINE

## 2022-06-06 RX ORDER — BUSPIRONE HYDROCHLORIDE 5 MG/1
5 TABLET ORAL 3 TIMES DAILY
Qty: 90 TABLET | Refills: 2 | Status: SHIPPED | OUTPATIENT
Start: 2022-06-06

## 2022-06-06 NOTE — PROGRESS NOTES
Subjective   Mercy Johnson is a 41 y.o. female.   Cc: follow up of chronic medical issues    Depression  Visit Type: follow-up  Patient presents with the following symptoms: confusion, depressed mood, dry mouth, feelings of hopelessness, insomnia, memory impairment and nervousness/anxiety.  Patient is not experiencing: anhedonia, choking sensation, compulsions, decreased concentration, dizziness, excessive worry, fatigue, feelings of worthlessness, hyperventilation, irritability, malaise, muscle tension, nausea, obsessions, restlessness, shortness of breath, suicidal ideas, suicidal planning and thoughts of death.    Insomnia  This is a chronic problem. The current episode started more than 1 year ago. The problem occurs daily. Associated symptoms include abdominal pain, a change in bowel habit and headaches. Pertinent negatives include no anorexia, arthralgias, chest pain, chills, congestion, coughing, diaphoresis, fatigue, fever, joint swelling, myalgias, nausea, neck pain, numbness, rash, sore throat, swollen glands, urinary symptoms, vertigo, visual change, vomiting or weakness.   Anxiety  Presents for follow-up visit. Symptoms include confusion, depressed mood, dry mouth, insomnia and nervous/anxious behavior. Patient reports no chest pain, compulsions, decreased concentration, excessive worry, hyperventilation, irritability, malaise, muscle tension, nausea, obsessions, restlessness, shortness of breath or suicidal ideas.     Her past medical history is significant for depression.       The following portions of the patient's history were reviewed and updated as appropriate: allergies, current medications, past family history, past medical history, past social history, past surgical history and problem list.    Review of Systems   Constitutional: Negative for chills, diaphoresis, fatigue, fever and irritability.   HENT: Negative for congestion and sore throat.    Respiratory: Negative for cough, choking and  "shortness of breath.    Cardiovascular: Negative for chest pain.   Gastrointestinal: Positive for abdominal pain and change in bowel habit. Negative for anorexia, nausea and vomiting.   Musculoskeletal: Negative for arthralgias, joint swelling, myalgias and neck pain.   Skin: Negative for rash.   Neurological: Positive for headaches. Negative for vertigo, weakness and numbness.   Psychiatric/Behavioral: Positive for confusion. Negative for decreased concentration and suicidal ideas. The patient is nervous/anxious and has insomnia.        Objective   Physical Exam  Vitals reviewed.   Constitutional:       Appearance: Normal appearance.   HENT:      Head: Normocephalic and atraumatic.      Right Ear: Tympanic membrane, ear canal and external ear normal.      Left Ear: Tympanic membrane, ear canal and external ear normal.      Nose: Nose normal.      Mouth/Throat:      Mouth: Mucous membranes are moist.      Pharynx: Oropharynx is clear.   Cardiovascular:      Rate and Rhythm: Normal rate.      Heart sounds: Normal heart sounds. No murmur heard.    No friction rub. No gallop.   Pulmonary:      Effort: Pulmonary effort is normal. No respiratory distress.      Breath sounds: Normal breath sounds. No stridor. No wheezing, rhonchi or rales.   Chest:      Chest wall: No tenderness.   Abdominal:      General: Bowel sounds are normal. There is no distension.      Palpations: Abdomen is soft. There is no mass.      Tenderness: There is no abdominal tenderness. There is no guarding or rebound.      Hernia: No hernia is present.   Musculoskeletal:      Cervical back: Normal range of motion.   Skin:     General: Skin is warm and dry.   Neurological:      Mental Status: She is alert.           Visit Vitals  /70   Pulse 83   Ht 160 cm (63\")   Wt 59.4 kg (131 lb 1 oz)   LMP 01/05/2010 (Exact Date) Comment: HYSTO   SpO2 99%   Breastfeeding No   BMI 23.22 kg/m²     Body mass index is 23.22 kg/m².      Assessment/Plan   Diagnoses " and all orders for this visit:    1. Recurrent major depressive disorder, remission status unspecified (HCC) (Primary)  -     Comprehensive metabolic panel; Future  -     Cancel: Lipid panel; Future  -     CBC w AUTO Differential; Future    2. Insomnia, unspecified type    3. Anxiety  -     CBC w AUTO Differential; Future    Other orders  -     busPIRone (BUSPAR) 5 MG tablet; Take 1 tablet by mouth 3 (Three) Times a Day.  Dispense: 90 tablet; Refill: 2    I reviewed her CBC and CMP with the patient.  Return to the clinic in 3 month/s.  Will contact with results as needed.

## 2022-06-29 ENCOUNTER — OFFICE VISIT (OUTPATIENT)
Dept: GASTROENTEROLOGY | Facility: CLINIC | Age: 42
End: 2022-06-29

## 2022-06-29 VITALS
BODY MASS INDEX: 23.28 KG/M2 | HEIGHT: 63 IN | SYSTOLIC BLOOD PRESSURE: 94 MMHG | HEART RATE: 78 BPM | WEIGHT: 131.4 LBS | DIASTOLIC BLOOD PRESSURE: 68 MMHG

## 2022-06-29 DIAGNOSIS — R19.7 DIARRHEA, UNSPECIFIED TYPE: ICD-10-CM

## 2022-06-29 DIAGNOSIS — R10.10 PAIN OF UPPER ABDOMEN: Primary | ICD-10-CM

## 2022-06-29 DIAGNOSIS — K21.00 GASTROESOPHAGEAL REFLUX DISEASE WITH ESOPHAGITIS WITHOUT HEMORRHAGE: ICD-10-CM

## 2022-06-29 PROCEDURE — 99213 OFFICE O/P EST LOW 20 MIN: CPT | Performed by: PHYSICIAN ASSISTANT

## 2022-07-18 RX ORDER — OMEPRAZOLE 40 MG/1
40 CAPSULE, DELAYED RELEASE ORAL DAILY
Qty: 30 CAPSULE | Refills: 3 | Status: SHIPPED | OUTPATIENT
Start: 2022-07-18 | End: 2022-09-16 | Stop reason: SDUPTHER

## 2022-08-09 ENCOUNTER — HOSPITAL ENCOUNTER (EMERGENCY)
Facility: HOSPITAL | Age: 42
Discharge: HOME | End: 2022-08-09
Attending: EMERGENCY MEDICINE | Admitting: EMERGENCY MEDICINE

## 2022-08-09 VITALS
DIASTOLIC BLOOD PRESSURE: 77 MMHG | HEIGHT: 62 IN | WEIGHT: 130 LBS | TEMPERATURE: 97.3 F | SYSTOLIC BLOOD PRESSURE: 110 MMHG | BODY MASS INDEX: 23.92 KG/M2 | RESPIRATION RATE: 16 BRPM | HEART RATE: 80 BPM | OXYGEN SATURATION: 99 %

## 2022-08-09 DIAGNOSIS — S05.02XA CONJUNCTIVAL ABRASION, LEFT, INITIAL ENCOUNTER: Primary | ICD-10-CM

## 2022-08-09 PROCEDURE — 99283 EMERGENCY DEPT VISIT LOW MDM: CPT

## 2022-08-09 RX ORDER — GENTAMICIN SULFATE 3 MG/ML
2 SOLUTION/ DROPS OPHTHALMIC EVERY 4 HOURS
Qty: 5 ML | Refills: 0 | Status: SHIPPED | OUTPATIENT
Start: 2022-08-09

## 2022-08-09 RX ORDER — TETRACAINE HYDROCHLORIDE 5 MG/ML
2 SOLUTION OPHTHALMIC ONCE
Status: COMPLETED | OUTPATIENT
Start: 2022-08-09 | End: 2022-08-09

## 2022-08-09 RX ORDER — DICLOFENAC SODIUM 1 MG/ML
1 SOLUTION/ DROPS OPHTHALMIC 4 TIMES DAILY PRN
Qty: 2.5 ML | Refills: 0 | Status: SHIPPED | OUTPATIENT
Start: 2022-08-09

## 2022-08-09 RX ADMIN — TETRACAINE HYDROCHLORIDE 2 DROP: 5 SOLUTION OPHTHALMIC at 18:08

## 2022-08-09 RX ADMIN — FLUORESCEIN SODIUM 1 STRIP: 1 STRIP OPHTHALMIC at 18:12

## 2022-08-09 NOTE — DISCHARGE INSTRUCTIONS
Fill your prescriptions and take as directed. You may use the Gentamicin drops every two hours for the next 12 hours if needed for comfort, then every 4 hours as prescribed. Follow up with an ophthalmologist of your choice for reevaluation, call tomorrow for appointment. No contacts for the next 10 days. Return back to the ER if symptoms worsen or change in presentation.

## 2022-08-09 NOTE — ED NOTES
Pt c/o left eye pain since yesterday. She states it starting hurting and she took her contacts out, denies injury to left eye.

## 2022-08-09 NOTE — ED PROVIDER NOTES
Subjective   She states she wears contacts.  She states yesterday while her contact was then she started feeling like that there was something in her eye.  She removed her contact but still continues with the feeling of a foreign body sensation to the inner part of her left eye.  Denies visual changes at this time.  She does state that her eye is sensitive to light.  Has not taken anything for pain at home.          Review of Systems   Constitutional: Negative for chills and fever.   Eyes: Positive for photophobia, pain and redness. Negative for visual disturbance.   Neurological: Negative for dizziness and headaches.       Past Medical History:   Diagnosis Date   • Acute gingivitis, plaque induced    • Allergic rhinitis     Claritin, doing well      • Asthma    • Attention deficit hyperactivity disorder, predominantly inattentive type    • Backache     Chronic, on Narcotics, helping      • Candidiasis of mouth    • Constipation    • Depressive disorder    • Diarrhea    • Low back pain    • Medication monitoring encounter      UDS ordered w/ confirmatory test      • Medication monitoring encounter     UDS ordered w/ confirmatory test      • Nausea and vomiting    • Pain and other symptoms associated with female genital organs    • Pain in throat    • Thoracic region somatic dysfunction     Controlled with Percocet only      • Tobacco dependence syndrome    • Upper respiratory infection    • Vulvovaginitis        Allergies   Allergen Reactions   • Amoxicillin Anaphylaxis   • Penicillins Anaphylaxis   • Doxycycline Nausea And Vomiting       Past Surgical History:   Procedure Laterality Date   • COLONOSCOPY N/A 1/19/2022    Procedure: COLONOSCOPY--bx, look TI;  Surgeon: Sathish Pineda MD;  Location: [facility] ENDOSCOPY;  Service: Gastroenterology;  Laterality: N/A;   • ENDOSCOPY N/A 1/19/2022    Procedure: ESOPHAGOGASTRODUODENOSCOPY--bx;  Surgeon: Sathish Pineda MD;  Location: [facility] ENDOSCOPY;  Service:  "Gastroenterology;  Laterality: N/A;   • HYSTERECTOMY     • INJECTION OF MEDICATION  01/19/2011    kenalog(1)    • INJECTION OF MEDICATION  09/14/2011    phenergan(1)    • UPPER GASTROINTESTINAL ENDOSCOPY  01/19/2022       Family History   Problem Relation Age of Onset   • Thyroid nodules Maternal Grandmother    • Drug abuse Maternal Grandmother    • Anxiety disorder Maternal Grandmother    • Arthritis Maternal Grandmother    • Depression Maternal Grandmother    • Thyroid disease Maternal Grandmother    • Drug abuse Mother    • Anxiety disorder Mother    • Arthritis Mother    • Asthma Mother    • Depression Mother    • Mental illness Mother    • Anxiety disorder Father    • Depression Father    • Mental illness Father    • Drug abuse Brother    • Anxiety disorder Paternal Aunt    • Drug abuse Paternal Uncle    • Anxiety disorder Paternal Uncle    • Hearing loss Paternal Uncle    • Diabetes Paternal Grandfather    • Hearing loss Paternal Grandfather    • Mental illness Paternal Grandfather        Social History     Socioeconomic History   • Marital status: Single   Tobacco Use   • Smoking status: Current Every Day Smoker     Packs/day: 0.50     Years: 25.00     Pack years: 12.50     Types: Cigarettes   • Smokeless tobacco: Never Used   Vaping Use   • Vaping Use: Never used   Substance and Sexual Activity   • Alcohol use: Not Currently     Comment: hard liquor; Has 1 drink/week on average, RARE   • Drug use: Yes     Types: Marijuana     Comment: quit Meth 2x 2 years.   • Sexual activity: Yes     Partners: Male           Objective    BP 98/74 (BP Location: Left arm, Patient Position: Sitting)   Pulse 91   Temp 97.3 °F (36.3 °C) (Oral)   Resp 20   Ht 157.5 cm (62\")   Wt 59 kg (130 lb)   LMP 01/05/2010 (Exact Date) Comment: HYSTO  SpO2 97%   BMI 23.78 kg/m²     Physical Exam  Vitals and nursing note reviewed.   Constitutional:       General: She is not in acute distress.     Appearance: Normal appearance. She is " not ill-appearing.   HENT:      Head: Normocephalic and atraumatic.   Eyes:      General: Lids are normal. Lids are everted, no foreign bodies appreciated. Vision grossly intact. Gaze aligned appropriately.         Left eye: No foreign body, discharge or hordeolum.      Extraocular Movements: Extraocular movements intact.      Conjunctiva/sclera:      Left eye: Left conjunctiva is injected.        Comments: abrasion   Cardiovascular:      Rate and Rhythm: Normal rate.   Pulmonary:      Effort: Pulmonary effort is normal.   Musculoskeletal:         General: Normal range of motion.   Skin:     General: Skin is warm and dry.      Capillary Refill: Capillary refill takes less than 2 seconds.   Neurological:      Mental Status: She is alert and oriented to person, place, and time.   Psychiatric:         Mood and Affect: Mood normal.         Behavior: Behavior normal.         Procedures           ED Course  ED Course as of 08/09/22 1819 Tue Aug 09, 2022   1755 Left eye numbed with tetracaine 2 gtts. Stained with fluorescein and examined under woods lamp. There was a small conjunctival abrasion noted at the 9:00 position. Eye flushed with saline. Offered patient Motrin at this time and patient declined. Discussed use of Rx eye gtts, no contacts for the next 5-7 days, and follow up with her eye doctor. Patient verbalized understanding.  [SH]   9096 Visual acuity performed uncorrected by nursing as patient does not have glasses with her. OS 20/70, OU 20/40 [SH]      ED Course User Index  [SH] Chanel Ho APRN                                           [facility]    Final diagnoses:   Conjunctival abrasion, left, initial encounter       ED Disposition  ED Disposition     ED Disposition   Discharge    Condition   Stable    Comment   --             ophthalmology of your choice  Call tomorrow for appointment.             Medication List      New Prescriptions    diclofenac 0.1 % ophthalmic solution  Commonly known as:  VOLTAREN  Administer 1 drop into the left eye 4 (Four) Times a Day As Needed (left eye pain).     gentamicin 0.3 % ophthalmic solution  Commonly known as: GARAMYCIN  Administer 2 drops into the left eye Every 4 (Four) Hours.           Where to Get Your Medications      These medications were sent to [facility]/pharmacy #[id number] - [place], [place] - [address] [place] [address] - [phone]  - [phone] [address] [address]    Phone: [phone]   · diclofenac 0.1 % ophthalmic solution  · gentamicin 0.3 % ophthalmic solution          Chanel Ho, BART  08/09/22 1932

## 2022-08-17 RX ORDER — BUPROPION HYDROCHLORIDE 150 MG/1
TABLET, EXTENDED RELEASE ORAL
Qty: 60 TABLET | Refills: 2 | Status: SHIPPED | OUTPATIENT
Start: 2022-08-17 | End: 2022-09-16 | Stop reason: SDUPTHER

## 2022-08-23 RX ORDER — ARIPIPRAZOLE 5 MG/1
5 TABLET ORAL NIGHTLY
Qty: 30 TABLET | Refills: 0 | Status: SHIPPED | OUTPATIENT
Start: 2022-08-23 | End: 2022-09-16 | Stop reason: SDUPTHER

## 2022-09-16 RX ORDER — OMEPRAZOLE 40 MG/1
40 CAPSULE, DELAYED RELEASE ORAL DAILY
Qty: 30 CAPSULE | Refills: 2 | Status: SHIPPED | OUTPATIENT
Start: 2022-09-16

## 2022-09-16 RX ORDER — ARIPIPRAZOLE 5 MG/1
5 TABLET ORAL NIGHTLY
Qty: 30 TABLET | Refills: 0 | Status: SHIPPED | OUTPATIENT
Start: 2022-09-16

## 2022-09-16 RX ORDER — BUPROPION HYDROCHLORIDE 150 MG/1
150 TABLET, EXTENDED RELEASE ORAL 2 TIMES DAILY
Qty: 60 TABLET | Refills: 2 | Status: SHIPPED | OUTPATIENT
Start: 2022-09-16

## 2022-09-21 RX ORDER — DICYCLOMINE HCL 20 MG
20 TABLET ORAL 2 TIMES DAILY
Qty: 60 TABLET | Refills: 1 | Status: SHIPPED | OUTPATIENT
Start: 2022-09-21

## (undated) DEVICE — TRAP SXN POLYP QUICKCATCH LF

## (undated) DEVICE — Device: Brand: DISPOSABLE ELECTROSURGICAL SNARE

## (undated) DEVICE — SINGLE-USE BIOPSY FORCEPS: Brand: RADIAL JAW 4

## (undated) DEVICE — BITEBLOCK ENDO W/STRAP 60F A/ LF DISP